# Patient Record
Sex: FEMALE | Race: WHITE | Employment: FULL TIME | URBAN - NONMETROPOLITAN AREA
[De-identification: names, ages, dates, MRNs, and addresses within clinical notes are randomized per-mention and may not be internally consistent; named-entity substitution may affect disease eponyms.]

---

## 2018-06-14 ENCOUNTER — HOSPITAL ENCOUNTER (OUTPATIENT)
Dept: WOMENS IMAGING | Age: 43
Discharge: HOME | End: 2018-06-14
Payer: COMMERCIAL

## 2018-06-14 DIAGNOSIS — Z12.31 VISIT FOR SCREENING MAMMOGRAM: ICD-10-CM

## 2018-06-14 PROCEDURE — 77067 SCR MAMMO BI INCL CAD: CPT

## 2018-06-20 ENCOUNTER — ANESTHESIA (OUTPATIENT)
Dept: ENDOSCOPY | Age: 43
End: 2018-06-20
Payer: COMMERCIAL

## 2018-06-20 ENCOUNTER — HOSPITAL ENCOUNTER (OUTPATIENT)
Age: 43
Setting detail: OUTPATIENT SURGERY
Discharge: HOME | End: 2018-06-20
Attending: INTERNAL MEDICINE | Admitting: INTERNAL MEDICINE
Payer: COMMERCIAL

## 2018-06-20 ENCOUNTER — ANESTHESIA EVENT (OUTPATIENT)
Dept: ENDOSCOPY | Age: 43
End: 2018-06-20
Payer: COMMERCIAL

## 2018-06-20 VITALS
DIASTOLIC BLOOD PRESSURE: 79 MMHG | SYSTOLIC BLOOD PRESSURE: 116 MMHG | OXYGEN SATURATION: 97 % | RESPIRATION RATE: 18 BRPM

## 2018-06-20 VITALS
HEIGHT: 64 IN | TEMPERATURE: 98.2 F | WEIGHT: 233 LBS | HEART RATE: 70 BPM | OXYGEN SATURATION: 99 % | RESPIRATION RATE: 18 BRPM | DIASTOLIC BLOOD PRESSURE: 71 MMHG | BODY MASS INDEX: 39.78 KG/M2 | SYSTOLIC BLOOD PRESSURE: 121 MMHG

## 2018-06-20 PROCEDURE — 3609017100 HC EGD: Performed by: INTERNAL MEDICINE

## 2018-06-20 PROCEDURE — 2500000003 HC RX 250 WO HCPCS: Performed by: NURSE ANESTHETIST, CERTIFIED REGISTERED

## 2018-06-20 PROCEDURE — 6360000002 HC RX W HCPCS: Performed by: NURSE ANESTHETIST, CERTIFIED REGISTERED

## 2018-06-20 PROCEDURE — 7100000001 HC PACU RECOVERY - ADDTL 15 MIN: Performed by: INTERNAL MEDICINE

## 2018-06-20 PROCEDURE — 3609027000 HC COLONOSCOPY: Performed by: INTERNAL MEDICINE

## 2018-06-20 PROCEDURE — 2580000003 HC RX 258: Performed by: INTERNAL MEDICINE

## 2018-06-20 PROCEDURE — 7100000000 HC PACU RECOVERY - FIRST 15 MIN: Performed by: INTERNAL MEDICINE

## 2018-06-20 PROCEDURE — 3700000001 HC ADD 15 MINUTES (ANESTHESIA): Performed by: INTERNAL MEDICINE

## 2018-06-20 PROCEDURE — 3700000000 HC ANESTHESIA ATTENDED CARE: Performed by: INTERNAL MEDICINE

## 2018-06-20 RX ORDER — KETAMINE HYDROCHLORIDE 50 MG/ML
INJECTION, SOLUTION, CONCENTRATE INTRAMUSCULAR; INTRAVENOUS PRN
Status: DISCONTINUED | OUTPATIENT
Start: 2018-06-20 | End: 2018-06-20 | Stop reason: SDUPTHER

## 2018-06-20 RX ORDER — SODIUM CHLORIDE 450 MG/100ML
INJECTION, SOLUTION INTRAVENOUS CONTINUOUS
Status: DISCONTINUED | OUTPATIENT
Start: 2018-06-20 | End: 2018-06-20 | Stop reason: HOSPADM

## 2018-06-20 RX ORDER — GLYCOPYRROLATE 1 MG/5 ML
SYRINGE (ML) INTRAVENOUS PRN
Status: DISCONTINUED | OUTPATIENT
Start: 2018-06-20 | End: 2018-06-20 | Stop reason: SDUPTHER

## 2018-06-20 RX ORDER — LIDOCAINE HYDROCHLORIDE 20 MG/ML
INJECTION, SOLUTION INFILTRATION; PERINEURAL PRN
Status: DISCONTINUED | OUTPATIENT
Start: 2018-06-20 | End: 2018-06-20 | Stop reason: SDUPTHER

## 2018-06-20 RX ORDER — PROPOFOL 10 MG/ML
INJECTION, EMULSION INTRAVENOUS PRN
Status: DISCONTINUED | OUTPATIENT
Start: 2018-06-20 | End: 2018-06-20 | Stop reason: SDUPTHER

## 2018-06-20 RX ADMIN — KETAMINE HYDROCHLORIDE 25 MG: 50 INJECTION, SOLUTION INTRAMUSCULAR; INTRAVENOUS at 11:15

## 2018-06-20 RX ADMIN — PROPOFOL 100 MG: 10 INJECTION, EMULSION INTRAVENOUS at 11:15

## 2018-06-20 RX ADMIN — SODIUM CHLORIDE: 4.5 INJECTION, SOLUTION INTRAVENOUS at 11:09

## 2018-06-20 RX ADMIN — LIDOCAINE HYDROCHLORIDE 100 MG: 20 INJECTION, SOLUTION INFILTRATION; PERINEURAL at 11:15

## 2018-06-20 RX ADMIN — Medication 0.2 MG: at 11:15

## 2018-06-20 RX ADMIN — PROPOFOL 50 MG: 10 INJECTION, EMULSION INTRAVENOUS at 11:21

## 2018-06-20 RX ADMIN — PROPOFOL 50 MG: 10 INJECTION, EMULSION INTRAVENOUS at 11:28

## 2018-06-20 RX ADMIN — PROPOFOL 50 MG: 10 INJECTION, EMULSION INTRAVENOUS at 11:18

## 2018-06-20 ASSESSMENT — PAIN SCALES - GENERAL
PAINLEVEL_OUTOF10: 0
PAINLEVEL_OUTOF10: 0

## 2018-06-20 ASSESSMENT — PAIN - FUNCTIONAL ASSESSMENT: PAIN_FUNCTIONAL_ASSESSMENT: 0-10

## 2018-06-20 ASSESSMENT — COPD QUESTIONNAIRES: CAT_SEVERITY: MODERATE

## 2018-06-20 ASSESSMENT — ENCOUNTER SYMPTOMS: DYSPNEA ACTIVITY LEVEL: NO INTERVAL CHANGE

## 2019-08-28 ENCOUNTER — HOSPITAL ENCOUNTER (OUTPATIENT)
Age: 44
Setting detail: SPECIMEN
Discharge: HOME | End: 2019-08-28
Payer: COMMERCIAL

## 2019-08-28 LAB
ALBUMIN SERPL-MCNC: 4.3 G/DL (ref 3.5–5.2)
ALBUMIN/GLOBULIN RATIO: 1.4 (ref 1–2.5)
ALP BLD-CCNC: 52 U/L (ref 35–104)
ALT SERPL-CCNC: 19 U/L (ref 5–33)
ANION GAP SERPL CALCULATED.3IONS-SCNC: 15 MMOL/L (ref 9–17)
AST SERPL-CCNC: 21 U/L
BILIRUB SERPL-MCNC: 0.18 MG/DL (ref 0.3–1.2)
BILIRUBIN DIRECT: <0.08 MG/DL
BILIRUBIN, INDIRECT: ABNORMAL MG/DL (ref 0–1)
BUN BLDV-MCNC: 14 MG/DL (ref 6–20)
BUN/CREAT BLD: ABNORMAL (ref 9–20)
CALCIUM SERPL-MCNC: 10 MG/DL (ref 8.6–10.4)
CHLORIDE BLD-SCNC: 99 MMOL/L (ref 98–107)
CHOLESTEROL/HDL RATIO: 5
CHOLESTEROL: 212 MG/DL
CO2: 24 MMOL/L (ref 20–31)
CREAT SERPL-MCNC: 0.55 MG/DL (ref 0.5–0.9)
GFR AFRICAN AMERICAN: >60 ML/MIN
GFR NON-AFRICAN AMERICAN: >60 ML/MIN
GFR SERPL CREATININE-BSD FRML MDRD: ABNORMAL ML/MIN/{1.73_M2}
GFR SERPL CREATININE-BSD FRML MDRD: ABNORMAL ML/MIN/{1.73_M2}
GLOBULIN: ABNORMAL G/DL (ref 1.5–3.8)
GLUCOSE BLD-MCNC: 133 MG/DL (ref 70–99)
HDLC SERPL-MCNC: 42 MG/DL
LDL CHOLESTEROL: 118 MG/DL (ref 0–130)
POTASSIUM SERPL-SCNC: 3.7 MMOL/L (ref 3.7–5.3)
SODIUM BLD-SCNC: 138 MMOL/L (ref 135–144)
TOTAL PROTEIN: 7.3 G/DL (ref 6.4–8.3)
TRIGL SERPL-MCNC: 261 MG/DL
VLDLC SERPL CALC-MCNC: ABNORMAL MG/DL (ref 1–30)

## 2019-12-30 ENCOUNTER — HOSPITAL ENCOUNTER (OUTPATIENT)
Age: 44
Setting detail: SPECIMEN
Discharge: HOME | End: 2019-12-30
Payer: COMMERCIAL

## 2019-12-30 LAB
ALBUMIN SERPL-MCNC: 4 G/DL (ref 3.5–5.2)
ALBUMIN/GLOBULIN RATIO: 1.3 (ref 1–2.5)
ALP BLD-CCNC: 46 U/L (ref 35–104)
ALT SERPL-CCNC: 15 U/L (ref 5–33)
ANION GAP SERPL CALCULATED.3IONS-SCNC: 15 MMOL/L (ref 9–17)
AST SERPL-CCNC: 16 U/L
BILIRUB SERPL-MCNC: <0.1 MG/DL (ref 0.3–1.2)
BUN BLDV-MCNC: 14 MG/DL (ref 6–20)
BUN/CREAT BLD: ABNORMAL (ref 9–20)
CALCIUM SERPL-MCNC: 9.1 MG/DL (ref 8.6–10.4)
CHLORIDE BLD-SCNC: 96 MMOL/L (ref 98–107)
CHOLESTEROL/HDL RATIO: 5.3
CHOLESTEROL: 197 MG/DL
CO2: 23 MMOL/L (ref 20–31)
CREAT SERPL-MCNC: 0.67 MG/DL (ref 0.5–0.9)
GFR AFRICAN AMERICAN: >60 ML/MIN
GFR NON-AFRICAN AMERICAN: >60 ML/MIN
GFR SERPL CREATININE-BSD FRML MDRD: ABNORMAL ML/MIN/{1.73_M2}
GFR SERPL CREATININE-BSD FRML MDRD: ABNORMAL ML/MIN/{1.73_M2}
GLUCOSE BLD-MCNC: 126 MG/DL (ref 70–99)
HCT VFR BLD CALC: 45.1 % (ref 36.3–47.1)
HDLC SERPL-MCNC: 37 MG/DL
HEMOGLOBIN: 14 G/DL (ref 11.9–15.1)
LDL CHOLESTEROL DIRECT: 104 MG/DL
LDL CHOLESTEROL: ABNORMAL MG/DL (ref 0–130)
MCH RBC QN AUTO: 28.6 PG (ref 25.2–33.5)
MCHC RBC AUTO-ENTMCNC: 31 G/DL (ref 28.4–34.8)
MCV RBC AUTO: 92 FL (ref 82.6–102.9)
NRBC AUTOMATED: 0 PER 100 WBC
PDW BLD-RTO: 14.2 % (ref 11.8–14.4)
PLATELET # BLD: 283 K/UL (ref 138–453)
PMV BLD AUTO: 11.7 FL (ref 8.1–13.5)
POTASSIUM SERPL-SCNC: 4.2 MMOL/L (ref 3.7–5.3)
RBC # BLD: 4.9 M/UL (ref 3.95–5.11)
SODIUM BLD-SCNC: 134 MMOL/L (ref 135–144)
THYROXINE, FREE: 0.3 NG/DL (ref 0.93–1.7)
TOTAL PROTEIN: 7 G/DL (ref 6.4–8.3)
TRIGL SERPL-MCNC: 404 MG/DL
TSH SERPL DL<=0.05 MIU/L-ACNC: 135.93 MIU/L (ref 0.3–5)
VLDLC SERPL CALC-MCNC: ABNORMAL MG/DL (ref 1–30)
WBC # BLD: 7.3 K/UL (ref 3.5–11.3)

## 2020-03-27 ENCOUNTER — NURSE ONLY (OUTPATIENT)
Dept: LAB | Age: 45
End: 2020-03-27

## 2020-03-27 LAB
ALBUMIN SERPL-MCNC: 3.8 G/DL (ref 3.5–5.1)
ALP BLD-CCNC: 47 U/L (ref 38–126)
ALT SERPL-CCNC: 12 U/L (ref 11–66)
AST SERPL-CCNC: 15 U/L (ref 5–40)
BILIRUB SERPL-MCNC: 0.3 MG/DL (ref 0.3–1.2)
BILIRUBIN DIRECT: < 0.2 MG/DL (ref 0–0.3)
CHOLESTEROL, TOTAL: 159 MG/DL (ref 100–199)
HDLC SERPL-MCNC: 37 MG/DL
LDL CHOLESTEROL CALCULATED: 81 MG/DL
TOTAL PROTEIN: 7.5 G/DL (ref 6.1–8)
TRIGL SERPL-MCNC: 204 MG/DL (ref 0–199)

## 2021-04-08 ENCOUNTER — HOSPITAL ENCOUNTER (OUTPATIENT)
Age: 46
Setting detail: SPECIMEN
Discharge: HOME | End: 2021-04-08
Payer: COMMERCIAL

## 2021-04-08 LAB
CHOLESTEROL/HDL RATIO: 5.1
CHOLESTEROL: 223 MG/DL
HDLC SERPL-MCNC: 44 MG/DL
LDL CHOLESTEROL: 111 MG/DL (ref 0–130)
THYROXINE, FREE: 1.47 NG/DL (ref 0.93–1.7)
TRIGL SERPL-MCNC: 338 MG/DL
TSH SERPL DL<=0.05 MIU/L-ACNC: 13.82 MIU/L (ref 0.3–5)
VLDLC SERPL CALC-MCNC: ABNORMAL MG/DL (ref 1–30)

## 2021-06-16 ENCOUNTER — HOSPITAL ENCOUNTER (OUTPATIENT)
Age: 46
Setting detail: SPECIMEN
Discharge: HOME | End: 2021-06-16
Payer: COMMERCIAL

## 2021-06-16 LAB — TSH SERPL DL<=0.05 MIU/L-ACNC: 1.69 MIU/L (ref 0.3–5)

## 2021-06-30 ENCOUNTER — HOSPITAL ENCOUNTER (OUTPATIENT)
Dept: ULTRASOUND IMAGING | Age: 46
Discharge: HOME | End: 2021-06-30
Payer: COMMERCIAL

## 2021-06-30 DIAGNOSIS — R10.9 ABDOMINAL PAIN, UNSPECIFIED ABDOMINAL LOCATION: ICD-10-CM

## 2021-06-30 PROCEDURE — 76705 ECHO EXAM OF ABDOMEN: CPT

## 2021-07-06 ENCOUNTER — TELEPHONE (OUTPATIENT)
Dept: SURGERY | Age: 46
End: 2021-07-06

## 2021-07-28 ENCOUNTER — OFFICE VISIT (OUTPATIENT)
Dept: SURGERY | Age: 46
End: 2021-07-28
Payer: COMMERCIAL

## 2021-07-28 VITALS
HEART RATE: 71 BPM | DIASTOLIC BLOOD PRESSURE: 62 MMHG | TEMPERATURE: 96.7 F | OXYGEN SATURATION: 98 % | WEIGHT: 225 LBS | RESPIRATION RATE: 18 BRPM | SYSTOLIC BLOOD PRESSURE: 104 MMHG | HEIGHT: 64 IN | BODY MASS INDEX: 38.41 KG/M2

## 2021-07-28 DIAGNOSIS — E11.9 TYPE 2 DIABETES MELLITUS WITHOUT COMPLICATION, UNSPECIFIED WHETHER LONG TERM INSULIN USE (HCC): ICD-10-CM

## 2021-07-28 DIAGNOSIS — Z01.818 PRE-OP TESTING: ICD-10-CM

## 2021-07-28 DIAGNOSIS — E66.9 OBESITY (BMI 30-39.9): ICD-10-CM

## 2021-07-28 DIAGNOSIS — K42.9 PERIUMBILICAL HERNIA: Primary | ICD-10-CM

## 2021-07-28 DIAGNOSIS — Z72.0 TOBACCO ABUSE: ICD-10-CM

## 2021-07-28 PROCEDURE — 99203 OFFICE O/P NEW LOW 30 MIN: CPT | Performed by: SURGERY

## 2021-07-28 RX ORDER — LATANOPROST 50 UG/ML
1 SOLUTION/ DROPS OPHTHALMIC NIGHTLY
COMMUNITY
Start: 2021-07-14

## 2021-07-28 RX ORDER — METFORMIN HYDROCHLORIDE 500 MG/1
1 TABLET, EXTENDED RELEASE ORAL 2 TIMES DAILY
COMMUNITY
Start: 2021-07-14

## 2021-07-28 RX ORDER — DOCUSATE SODIUM 100 MG/1
100 CAPSULE, LIQUID FILLED ORAL DAILY PRN
COMMUNITY

## 2021-07-28 RX ORDER — FERROUS SULFATE 325(65) MG
325 TABLET ORAL EVERY OTHER DAY
Status: ON HOLD | COMMUNITY
End: 2022-10-05 | Stop reason: HOSPADM

## 2021-07-28 RX ORDER — CLOPIDOGREL BISULFATE 75 MG/1
1 TABLET ORAL DAILY
Status: ON HOLD | COMMUNITY
Start: 2021-07-14 | End: 2021-08-25 | Stop reason: HOSPADM

## 2021-08-01 ASSESSMENT — ENCOUNTER SYMPTOMS
RECTAL PAIN: 0
ANAL BLEEDING: 0
WHEEZING: 0
VOICE CHANGE: 0
RHINORRHEA: 0
APNEA: 0
PHOTOPHOBIA: 0
FACIAL SWELLING: 0
COLOR CHANGE: 0
SINUS PRESSURE: 0
VOMITING: 0
NAUSEA: 0
EYE PAIN: 0
COUGH: 0
STRIDOR: 0
SHORTNESS OF BREATH: 0
SORE THROAT: 0
DIARRHEA: 0
ABDOMINAL DISTENTION: 0
EYE ITCHING: 0
BLOOD IN STOOL: 0
EYE DISCHARGE: 0
CHOKING: 0
ALLERGIC/IMMUNOLOGIC NEGATIVE: 1
ABDOMINAL PAIN: 1
TROUBLE SWALLOWING: 0
CONSTIPATION: 0
EYE REDNESS: 0
BACK PAIN: 0
CHEST TIGHTNESS: 0

## 2021-08-01 NOTE — PROGRESS NOTES
Ermias Muñiz (:  1975)     ASSESSMENT:  1.  Umbilical hernia  2. Obesity (BMI 38)   3. Tobacco abuse  4. Diabetes mellitus    PLAN:  1. Schedule Mackenzie for robotic possible open umbilical hernia repair with mesh. 2. She will undergo pre-operative clearance per anesthesia guidelines with risk factors listed under the past medical history diagnosis & problem list.  3. The risks, benefits and alternatives were discussed with Mackenzie including non-operative management. The pros and cons of robotic, laparoscopic and open techniques were discussed. The pros and cons of mesh insertion were discussed. All questions answered. She understands and wishes to proceed with surgical intervention. 4. Restrictions discussed with [address] and she expresses understanding. 5. She is advised to call back directly if there are further questions/concerns, or if her symptoms worsen prior to surgery. 6.  Encouraged tobacco cessation. 7.  Nutritional education occurred. Encourage weight loss. SUBJECTIVE/OBJECTIVE:    Chief Complaint   Patient presents with    Surgical Consult     New patient-referred by [facility]-Periumbilical hernia     HPI  [address] is a 65-year-old female presents for evaluation of a periumbilical hernia. She has had this now for more than a month. Gradually increasing in size. Difficult to reduce. Mild discomfort. Worse with increased activity, lifting and bending over. Worsens throughout the day when she is active. Improved with rest and lying down. Denies any generalized abdominal pain. No nausea or vomiting. Still tolerating a diet. No hematochezia or melena. Normal bowel function. No chest pain or shortness of breath. Tobacco abuse. BMI 38. Current weight 225 pounds. Diabetes mellitus. Recent ultrasound demonstrated 4.7 cm umbilical hernia with intra-abdominal adipose tissue as well as bowel within the hernia sac. Denies significant previous abdominal surgery.     Review of Systems   Constitutional: Negative for activity change, appetite change, chills, diaphoresis, fatigue, fever and unexpected weight change. HENT: Negative for congestion, dental problem, drooling, ear discharge, ear pain, facial swelling, hearing loss, mouth sores, nosebleeds, postnasal drip, rhinorrhea, sinus pressure, sneezing, sore throat, tinnitus, trouble swallowing and voice change. Eyes: Negative for photophobia, pain, discharge, redness, itching and visual disturbance. Respiratory: Negative for apnea, cough, choking, chest tightness, shortness of breath, wheezing and stridor. Cardiovascular: Negative for chest pain, palpitations and leg swelling. Gastrointestinal: Positive for abdominal pain. Negative for abdominal distention, anal bleeding, blood in stool, constipation, diarrhea, nausea, rectal pain and vomiting. Endocrine: Negative. Genitourinary: Negative for decreased urine volume, difficulty urinating, dyspareunia, dysuria, enuresis, flank pain, frequency, genital sores, hematuria, menstrual problem, pelvic pain, urgency, vaginal bleeding, vaginal discharge and vaginal pain. Musculoskeletal: Negative for arthralgias, back pain, gait problem, joint swelling, myalgias, neck pain and neck stiffness. Skin: Negative for color change, pallor, rash and wound. Allergic/Immunologic: Negative. Neurological: Negative for dizziness, tremors, seizures, syncope, facial asymmetry, speech difficulty, weakness, light-headedness, numbness and headaches. Hematological: Negative for adenopathy. Does not bruise/bleed easily. Psychiatric/Behavioral: Negative for agitation, behavioral problems, confusion, decreased concentration, dysphoric mood, hallucinations, self-injury, sleep disturbance and suicidal ideas. The patient is not nervous/anxious and is not hyperactive.         Past Medical History:   Diagnosis Date    Backache     COPD (chronic obstructive pulmonary disease) ([facility] [address]     Coronary artery disease     Dr. Napoleon Valdivia Environmental allergies     Hyperlipidemia     Hypertension     Hypothyroidism     Type 2 diabetes mellitus ([address]        Past Surgical History:   Procedure Laterality Date    COLONOSCOPY      [facility]  [facility]    MOUTH SURGERY  2014    teeth removed    [place] COLONOSCOPY FLX DX W/COLLJ SPEC WHEN PFRMD N/A 06/20/2018    COLONOSCOPY performed [facility] Melissa Tsang [facility] at [facility]    [place] EGD TRANSORAL BIOPSY SINGLE/MULTIPLE N/A 06/20/2018    [facility]    UPPER GASTROINTESTINAL ENDOSCOPY         Current Outpatient Medications   Medication Sig Dispense Refill    clopidogrel (PLAVIX) 75 MG tablet Take 1 tablet by mouth daily      metFORMIN (GLUCOPHAGE-XR) 500 MG extended release tablet Take 1 tablet by mouth 2 times daily      latanoprost (XALATAN) 0.005 % ophthalmic solution Apply 1 drop to eye nightly      docusate sodium (COLACE) 100 MG capsule Take 100 mg by mouth daily as needed for Constipation      ferrous sulfate (IRON 325) 325 (65 Fe) MG tablet Take 325 mg by mouth daily (with breakfast)      aspirin 81 MG chewable tablet Take 1 tablet by mouth daily 30 tablet 3    losartan (COZAAR) 25 MG tablet Take 1 tablet by mouth daily 30 tablet 3    metoprolol (LOPRESSOR) 25 MG tablet Take 1 tablet by mouth 2 times daily 60 tablet 3    levothyroxine (SYNTHROID) 200 MCG tablet Take 0.5 tablets by mouth daily (Patient taking differently: Take 175 mcg by mouth daily ) 30 tablet 3    hydrochlorothiazide (HYDRODIURIL) 25 MG tablet Take 25 mg by mouth daily      acetaminophen (TYLENOL) 325 MG tablet Take 650 mg by mouth every 6 hours as needed.  pravastatin (PRAVACHOL) 40 MG tablet Take 80 mg by mouth daily       therapeutic multivitamin-minerals (THERAGRAN-M) tablet Take 1 tablet by mouth daily.         albuterol sulfate  (90 BASE) MCG/ACT inhaler Inhale 2 puffs into the lungs every 6 hours as needed for Wheezing (Patient not taking: Reported on 7/28/2021)      nitroGLYCERIN (NITROSTAT) 0.4 MG SL tablet Place 1 tablet under the tongue every 5 minutes as needed for Chest pain (Patient not taking: Reported on 7/28/2021) 25 tablet 3     No current facility-administered medications for this visit. Allergies   Allergen Reactions    Vicodin [Hydrocodone-Acetaminophen] Anxiety       Family History   Problem Relation Age of Onset    Thyroid Disease Mother     Heart Disease Mother     Cancer Father     Diabetes Father     High Blood Pressure Father     Asthma Brother        Social History     Socioeconomic History    Marital status: Single     Spouse name: Not on file    Number of children: Not on file    Years of education: Not on file    Highest education level: Not on file   Occupational History    Not on file   Tobacco Use    Smoking status: Current Every Day Smoker     Packs/day: 0.50     Types: Cigarettes    Smokeless tobacco: Never Used   Substance and Sexual Activity    Alcohol use: No     Comment: occasionally    Drug use: No    Sexual activity: Yes   Other Topics Concern    Not on file   Social History Narrative    Not on file     Social Determinants of Health     Financial Resource Strain:     Difficulty of Paying Living Expenses:    Food Insecurity:     Worried About Running Out of Food in the Last Year:     Ran Out of Food in the Last Year:    Transportation Needs:     Lack of Transportation (Medical):      Lack of Transportation (Non-Medical):    Physical Activity:     Days of Exercise per Week:     Minutes of Exercise per Session:    Stress:     Feeling of Stress :    Social Connections:     Frequency of Communication with Friends and Family:     Frequency of Social Gatherings with Friends and Family:     Attends Orthodox Services:     Active Member of Clubs or Organizations:     Attends Club or Organization Meetings:     Marital Status:    Intimate Partner Violence:     Fear of Current or Ex-Partner:     Emotionally Abused:     Physically Abused:     Sexually Abused:      Vitals:    07/28/21 1118   BP: 104/62   Site: Right Upper Arm   Position: Sitting   Cuff Size: Medium Adult   Pulse: 71   Resp: 18   Temp: 96.7 °F (35.9 °C)   TempSrc: Infrared   SpO2: 98%   Weight: 225 lb (102.1 kg)   Height: 5' 4\" (1.626 m)     Body mass index is 38.62 kg/m². Wt Readings from Last 3 Encounters:   07/28/21 225 lb (102.1 kg)   06/20/18 233 lb (105.7 kg)   04/14/16 220 lb (99.8 kg)     Physical Exam  Vitals reviewed. Constitutional:       General: She is not in acute distress. Appearance: She is well-developed. She is not diaphoretic. HENT:      Head: Normocephalic and atraumatic. Right Ear: External ear normal.      Left Ear: External ear normal.      Nose: Nose normal.   Eyes:      General: No scleral icterus. Right eye: No discharge. Left eye: No discharge. Conjunctiva/sclera: Conjunctivae normal.   Cardiovascular:      Rate and Rhythm: Normal rate and regular rhythm. Heart sounds: Normal heart sounds. Pulmonary:      Effort: Pulmonary effort is normal. No respiratory distress. Breath sounds: Normal breath sounds. No wheezing or rales. Chest:      Chest wall: No tenderness. Abdominal:      General: Bowel sounds are normal. There is no distension. Palpations: Abdomen is soft. There is no mass. Tenderness: There is abdominal tenderness in the periumbilical area. There is no guarding or rebound. Hernia: A hernia is present. Hernia is present in the umbilical area. Musculoskeletal:         General: No tenderness. Normal range of motion. Cervical back: Normal range of motion and neck supple. Skin:     General: Skin is warm and dry. Coloration: Skin is not pale. Findings: No erythema or rash. Neurological:      Mental Status: She is alert and oriented to person, place, and time. Cranial Nerves: No cranial nerve deficit. Psychiatric:         Behavior: Behavior normal.         Thought Content: Thought content normal.         Judgment: Judgment normal.       Lab Results   Component Value Date    WBC 7.3 12/30/2019    HGB 14.0 12/30/2019    HCT 45.1 12/30/2019    MCV 92.0 12/30/2019     12/30/2019     Lab Results   Component Value Date     (L) 12/30/2019    K 4.2 12/30/2019    CL 96 (L) 12/30/2019    CO2 23 12/30/2019     Lab Results   Component Value Date    CREATININE 0.67 12/30/2019     Lab Results   Component Value Date    ALT 12 03/27/2020    AST 15 03/27/2020    ALKPHOS 47 03/27/2020    BILITOT 0.3 03/27/2020     No results found for: LIPASE    Patient Active Problem List   Diagnosis    Hypothyroidism    Cellulitis of leg, right    Hyperlipidemia    Chest pain at rest    H/O class III angina pectoris    Abnormal nuclear stress test     Narrative   EXAMINATION: US ABDOMEN LIMITED.       HISTORY: 44-year-old patient with periumbilical abdominal pain.       TECHNIQUE: Ultrasound scan was carried out through the periumbilical region.       COMPARISON: None available.       FINDINGS: There appears be a periumbilical hernia containing fat and bowel loops measuring 4.7 cm in length.           Impression   1. Periumbilical hernia containing fat and bowel loops measuring 4.7 cm in length.       Final report electronically signed by DR Antonette Bess on 6/30/2021 10:25 AM       An electronic signature was used to authenticate this note.     --Chris Moya MD

## 2021-08-27 ENCOUNTER — TELEPHONE (OUTPATIENT)
Dept: SURGERY | Age: 46
End: 2021-08-27

## 2021-08-27 NOTE — TELEPHONE ENCOUNTER
Spoke w/ pt after receiving clearance from Dr. Charito Jones to proceed with surgery on 9/16, pt moderate to high risk. Due to Dr. Aguilar Baker changing pt  From Plavix to Eliquis called pt and went over her clearance being moderate to high risk for surgery, also informed pt she she would need to hold Eliquis 2 days prior to surgery instead of 5 for Plavix. Pt unsure at this point if she wants to proceed with surgery since she is at higher risk. She says she would like to think it over and call me back with a decision next week.

## 2021-09-07 NOTE — TELEPHONE ENCOUNTER
Reached out to patient to see if she would like to proceed with surgery next Thursday knowing she is at high risk, pt wishes to cancel surgery at this time.    Will continue to follow up with Dr. Jim Hernandez

## 2022-02-21 ENCOUNTER — HOSPITAL ENCOUNTER (OUTPATIENT)
Age: 47
Setting detail: SPECIMEN
Discharge: HOME | End: 2022-02-21

## 2022-02-21 LAB
ALBUMIN SERPL-MCNC: 4.3 G/DL (ref 3.5–5.2)
ALBUMIN/GLOBULIN RATIO: 1.5 (ref 1–2.5)
ALP BLD-CCNC: 65 U/L (ref 35–104)
ALT SERPL-CCNC: 14 U/L (ref 5–33)
ANION GAP SERPL CALCULATED.3IONS-SCNC: 13 MMOL/L (ref 9–17)
AST SERPL-CCNC: 15 U/L
BILIRUB SERPL-MCNC: 0.26 MG/DL (ref 0.3–1.2)
BILIRUBIN DIRECT: 0.08 MG/DL
BILIRUBIN, INDIRECT: 0.18 MG/DL (ref 0–1)
BUN BLDV-MCNC: 13 MG/DL (ref 6–20)
CALCIUM SERPL-MCNC: 9.7 MG/DL (ref 8.6–10.4)
CHLORIDE BLD-SCNC: 98 MMOL/L (ref 98–107)
CHOLESTEROL/HDL RATIO: 5.7
CHOLESTEROL: 233 MG/DL
CO2: 25 MMOL/L (ref 20–31)
CREAT SERPL-MCNC: 0.58 MG/DL (ref 0.5–0.9)
GFR AFRICAN AMERICAN: >60 ML/MIN
GFR NON-AFRICAN AMERICAN: >60 ML/MIN
GFR SERPL CREATININE-BSD FRML MDRD: ABNORMAL ML/MIN/{1.73_M2}
GLUCOSE BLD-MCNC: 114 MG/DL (ref 70–99)
HCT VFR BLD CALC: 45.2 % (ref 36.3–47.1)
HDLC SERPL-MCNC: 41 MG/DL
HEMOGLOBIN: 14.3 G/DL (ref 11.9–15.1)
LDL CHOLESTEROL: 133 MG/DL (ref 0–130)
MCH RBC QN AUTO: 27.9 PG (ref 25.2–33.5)
MCHC RBC AUTO-ENTMCNC: 31.6 G/DL (ref 28.4–34.8)
MCV RBC AUTO: 88.3 FL (ref 82.6–102.9)
NRBC AUTOMATED: 0 PER 100 WBC
PDW BLD-RTO: 15.2 % (ref 11.8–14.4)
PLATELET # BLD: 261 K/UL (ref 138–453)
PMV BLD AUTO: 12.2 FL (ref 8.1–13.5)
POTASSIUM SERPL-SCNC: 4.4 MMOL/L (ref 3.7–5.3)
RBC # BLD: 5.12 M/UL (ref 3.95–5.11)
SODIUM BLD-SCNC: 136 MMOL/L (ref 135–144)
TOTAL PROTEIN: 7.2 G/DL (ref 6.4–8.3)
TRIGL SERPL-MCNC: 295 MG/DL
WBC # BLD: 8.8 K/UL (ref 3.5–11.3)

## 2022-03-30 ENCOUNTER — OFFICE VISIT (OUTPATIENT)
Dept: SURGERY | Age: 47
End: 2022-03-30
Payer: COMMERCIAL

## 2022-03-30 VITALS
TEMPERATURE: 97.2 F | WEIGHT: 222 LBS | BODY MASS INDEX: 36.99 KG/M2 | HEIGHT: 65 IN | OXYGEN SATURATION: 98 % | RESPIRATION RATE: 14 BRPM | SYSTOLIC BLOOD PRESSURE: 122 MMHG | HEART RATE: 67 BPM | DIASTOLIC BLOOD PRESSURE: 78 MMHG

## 2022-03-30 DIAGNOSIS — K42.9 PERIUMBILICAL HERNIA: Primary | ICD-10-CM

## 2022-03-30 PROCEDURE — 99213 OFFICE O/P EST LOW 20 MIN: CPT | Performed by: SURGERY

## 2022-03-30 RX ORDER — EZETIMIBE 10 MG/1
10 TABLET ORAL DAILY
COMMUNITY

## 2022-04-01 ENCOUNTER — TELEPHONE (OUTPATIENT)
Dept: SURGERY | Age: 47
End: 2022-04-01

## 2022-04-03 ASSESSMENT — ENCOUNTER SYMPTOMS
ABDOMINAL DISTENTION: 0
ABDOMINAL PAIN: 1
BACK PAIN: 0
COLOR CHANGE: 0
NAUSEA: 0
COUGH: 0
FACIAL SWELLING: 0
ANAL BLEEDING: 0
VOMITING: 0
EYE PAIN: 0
CHEST TIGHTNESS: 0
TROUBLE SWALLOWING: 0
SINUS PRESSURE: 0
EYE REDNESS: 0
PHOTOPHOBIA: 0
RECTAL PAIN: 0
CONSTIPATION: 0
SORE THROAT: 0
STRIDOR: 0
ALLERGIC/IMMUNOLOGIC NEGATIVE: 1
APNEA: 0
CHOKING: 0
VOICE CHANGE: 0
BLOOD IN STOOL: 0
RHINORRHEA: 0
WHEEZING: 0
EYE ITCHING: 0
EYE DISCHARGE: 0
DIARRHEA: 0
SHORTNESS OF BREATH: 0

## 2022-04-03 NOTE — PROGRESS NOTES
Gunnar Toney (:  1975)     ASSESSMENT:  1.  Umbilical hernia  2. Obesity (BMI 36)  3. Tobacco abuse  4. Diabetes mellitus    PLAN:  1. Schedule Mackenzie for robotic umbilical hernia repair with mesh. 2. She will undergo pre-operative clearance per anesthesia guidelines with risk factors listed under the past medical history diagnosis & problem list.  3. The risks, benefits and alternatives were discussed with Mackenzie including non-operative management. The pros and cons of robotic, laparoscopic and open techniques were discussed. The pros and cons of mesh insertion were discussed. All questions answered. She understands and wishes to proceed with surgical intervention. 4. Restrictions discussed with Rodrigo Ying and she expresses understanding. 5. She is advised to call back directly if there are further questions/concerns, or if her symptoms worsen prior to surgery. SUBJECTIVE/OBJECTIVE:    Chief Complaint   Patient presents with    Surgical Consult     Est patient-last seen in the office 2021-Periumbilical hernia-ready to schedule     HPI  Oragata Ayad is a 48-year-old female who was originally seen back in 7106 due to umbilical hernia. She was found to have umbilical hernia. She states this has slightly worsened. She is now cleared from a cardiac standpoint for surgery. Difficult to reduce hernia. Mild to moderate discomfort. Worse with increased activity, lifting and bending over. Worsens throughout the day when she is active. Improves with rest.  No skin changes. No generalized abdominal pain. Still tolerating a diet. No significant nausea or vomiting. Normal bowel function. No hematochezia or melena. No chest pain or shortness of breath. No new urinary complaints. She is wishing to proceed with surgery at this time. Review of Systems   Constitutional: Negative for activity change, appetite change, chills, diaphoresis, fatigue, fever and unexpected weight change.    HENT: Negative for congestion, dental problem, drooling, ear discharge, ear pain, facial swelling, hearing loss, mouth sores, nosebleeds, postnasal drip, rhinorrhea, sinus pressure, sneezing, sore throat, tinnitus, trouble swallowing and voice change. Eyes: Negative for photophobia, pain, discharge, redness, itching and visual disturbance. Respiratory: Negative for apnea, cough, choking, chest tightness, shortness of breath, wheezing and stridor. Cardiovascular: Negative for chest pain, palpitations and leg swelling. Gastrointestinal: Positive for abdominal pain. Negative for abdominal distention, anal bleeding, blood in stool, constipation, diarrhea, nausea, rectal pain and vomiting. Endocrine: Negative. Genitourinary: Negative for decreased urine volume, difficulty urinating, dyspareunia, dysuria, enuresis, flank pain, frequency, genital sores, hematuria, menstrual problem, pelvic pain, urgency, vaginal bleeding, vaginal discharge and vaginal pain. Musculoskeletal: Negative for arthralgias, back pain, gait problem, joint swelling, myalgias, neck pain and neck stiffness. Skin: Negative for color change, pallor, rash and wound. Allergic/Immunologic: Negative. Neurological: Negative for dizziness, tremors, seizures, syncope, facial asymmetry, speech difficulty, weakness, light-headedness, numbness and headaches. Hematological: Negative for adenopathy. Does not bruise/bleed easily. Psychiatric/Behavioral: Negative for agitation, behavioral problems, confusion, decreased concentration, dysphoric mood, hallucinations, self-injury, sleep disturbance and suicidal ideas. The patient is not nervous/anxious and is not hyperactive.         Past Medical History:   Diagnosis Date    Asthma     Backache     [facility] ([facility]     Patrick Ceron    Coronary artery disease     Dr. Eneida Hernandez Disease of blood and blood forming organ     anemia    Environmental allergies     Hyperlipidemia     Hypertension     Hypothyroidism     Type 2 diabetes mellitus ([address]        Past Surgical History:   Procedure Laterality Date    COLONOSCOPY      [facility]    MOUTH SURGERY  2014    teeth removed    [place] COLONOSCOPY FLX DX W/COLLJ SPEC WHEN PFRMD N/A 06/20/2018    COLONOSCOPY performed by Michelle Mayo MD at [facility]    [place] EGD TRANSORAL BIOPSY SINGLE/MULTIPLE N/A 06/20/2018    [facility]    UPPER GASTROINTESTINAL ENDOSCOPY         Current Outpatient Medications   Medication Sig Dispense Refill    ezetimibe (ZETIA) 10 MG tablet Take 10 mg by mouth daily      levothyroxine (SYNTHROID) 175 MCG tablet Take 175 mcg by mouth Daily      apixaban (ELIQUIS) 5 MG TABS tablet Take 1 tablet by mouth 2 times daily 180 tablet 1    metFORMIN (GLUCOPHAGE-XR) 500 MG extended release tablet Take 1 tablet by mouth 2 times daily      latanoprost (XALATAN) 0.005 % ophthalmic solution Apply 1 drop to eye nightly      docusate sodium (COLACE) 100 MG capsule Take 100 mg by mouth daily as needed for Constipation      ferrous sulfate (IRON 325) 325 (65 Fe) MG tablet Take 325 mg by mouth every other day       albuterol sulfate  (90 BASE) MCG/ACT inhaler Inhale 2 puffs into the lungs every 6 hours as needed for Wheezing       aspirin 81 MG chewable tablet Take 1 tablet by mouth daily 30 tablet 3    nitroGLYCERIN (NITROSTAT) 0.4 MG SL tablet Place 1 tablet under the tongue every 5 minutes as needed for Chest pain 25 tablet 3    losartan (COZAAR) 25 MG tablet Take 1 tablet by mouth daily 30 tablet 3    metoprolol (LOPRESSOR) 25 MG tablet Take 1 tablet by mouth 2 times daily 60 tablet 3    hydrochlorothiazide (HYDRODIURIL) 25 MG tablet Take 25 mg by mouth daily      acetaminophen (TYLENOL) 325 MG tablet Take 650 mg by mouth every 6 hours as needed.         pravastatin (PRAVACHOL) 40 MG tablet Take 80 mg by mouth daily       therapeutic multivitamin-minerals (THERAGRAN-M) tablet Take 1 tablet by mouth daily. No current facility-administered medications for this visit. Allergies   Allergen Reactions    Vicodin [Hydrocodone-Acetaminophen] Anxiety       Family History   Problem Relation Age of Onset    Thyroid Disease Mother     Heart Disease Mother     Cancer Father     Diabetes Father     High Blood Pressure Father     Asthma Brother        Social History     Socioeconomic History    Marital status: Single     Spouse name: Not on file    Number of children: Not on file    Years of education: Not on file    Highest education level: Not on file   Occupational History    Not on file   Tobacco Use    Smoking status: Current Every Day Smoker     Packs/day: 0.50     Types: Cigarettes    Smokeless tobacco: Never Used   Substance and Sexual Activity    Alcohol use: Yes     Comment: occasionally    Drug use: No    Sexual activity: Yes     Partners: Male   Other Topics Concern    Not on file   Social History Narrative    Not on file     Social Determinants of Health     Financial Resource Strain:     Difficulty of Paying Living Expenses: Not on file   Food Insecurity:     Worried About Running Out of Food in the Last Year: Not on file    [place]ie of Food in the Last Year: Not on file   Transportation Needs:     Lack of Transportation (Medical): Not on file    Lack of Transportation (Non-Medical):  Not on file   Physical Activity:     Days of Exercise per Week: Not on file    Minutes of Exercise per Session: Not on file   Stress:     Feeling of Stress : Not on file   Social Connections:     Frequency of Communication with Friends and Family: Not on file    Frequency of Social Gatherings with Friends and Family: Not on file    Attends Evangelical Services: Not on file    Active Member of Clubs or Organizations: Not on file    Attends Club or Organization Meetings: Not on file    Marital Status: Not on file   Intimate Partner Violence:     Fear of Current or Ex-Partner: Not on file    Emotionally Abused: Not on file    Physically Abused: Not on file    Sexually Abused: Not on file   Housing Stability:     Unable to Pay for Housing in the Last Year: Not on file    Number of [place] in the Last Year: Not on file    Unstable Housing in the Last Year: Not on file     Vitals:    03/30/22 1348   BP: 122/78   Site: Left Upper Arm   Position: Sitting   Cuff Size: Medium Adult   Pulse: 67   Resp: 14   Temp: 97.2 °F (36.2 °C)   TempSrc: Tympanic   SpO2: 98%   Weight: 222 lb (100.7 kg)   Height: 5' 5\" (1.651 m)     Body mass index is 36.94 kg/m². Wt Readings from Last 3 Encounters:   03/30/22 222 lb (100.7 kg)   08/25/21 225 lb (102.1 kg)   07/28/21 225 lb (102.1 kg)     Physical Exam  Vitals reviewed. Constitutional:       General: She is not in acute distress. Appearance: She is well-developed. She is not diaphoretic. HENT:      Head: Normocephalic and atraumatic. Right Ear: External ear normal.      Left Ear: External ear normal.      Nose: Nose normal.   Eyes:      General: No scleral icterus. Right eye: No discharge. Left eye: No discharge. Conjunctiva/sclera: Conjunctivae normal.   Cardiovascular:      Rate and Rhythm: Normal rate and regular rhythm. Heart sounds: Normal heart sounds. Pulmonary:      Effort: Pulmonary effort is normal. No respiratory distress. Breath sounds: Normal breath sounds. No wheezing or rales. Chest:      Chest wall: No tenderness. Abdominal:      General: Bowel sounds are normal. There is no distension. Palpations: Abdomen is soft. There is no mass. Tenderness: There is abdominal tenderness in the periumbilical area. There is no guarding or rebound. Hernia: A hernia is present. Hernia is present in the umbilical area. Musculoskeletal:         General: No tenderness. Normal range of motion. Cervical back: Normal range of motion and neck supple. Skin:     General: Skin is warm and dry. Coloration: Skin is not pale. Findings: No erythema or rash. Neurological:      Mental Status: She is alert and oriented to person, place, and time. Cranial Nerves: No cranial nerve deficit. Psychiatric:         Behavior: Behavior normal.         Thought Content: Thought content normal.         Judgment: Judgment normal.       Lab Results   Component Value Date    WBC 8.8 02/21/2022    HGB 14.3 02/21/2022    HCT 45.2 02/21/2022    MCV 88.3 02/21/2022     02/21/2022     Lab Results   Component Value Date     02/21/2022    K 4.4 02/21/2022    CL 98 02/21/2022    CO2 25 02/21/2022     Lab Results   Component Value Date    CREATININE 0.58 02/21/2022     Lab Results   Component Value Date    ALT 14 02/21/2022    AST 15 02/21/2022    ALKPHOS 65 02/21/2022    BILITOT 0.26 (L) 02/21/2022     No results found for: LIPASE    Patient Active Problem List   Diagnosis    Hypothyroidism    Cellulitis of leg, right    Hyperlipidemia    Chest pain at rest    H/O class III angina pectoris    Abnormal nuclear stress test       An electronic signature was used to authenticate this note.     --Katlyn Pozo MD

## 2022-04-13 ENCOUNTER — PREP FOR PROCEDURE (OUTPATIENT)
Dept: SURGERY | Age: 47
End: 2022-04-13

## 2022-04-13 RX ORDER — SODIUM CHLORIDE 9 MG/ML
INJECTION, SOLUTION INTRAVENOUS CONTINUOUS
Status: CANCELLED | OUTPATIENT
Start: 2022-04-13

## 2022-04-24 NOTE — H&P
Chad Cardenas (:  1975)      ASSESSMENT:  1.  Umbilical hernia  2. Obesity (BMI 36)  3. Tobacco abuse  4. Diabetes mellitus     PLAN:  1. Schedule Mackenzie for robotic umbilical hernia repair with mesh. 2. She will undergo pre-operative clearance per anesthesia guidelines with risk factors listed under the past medical history diagnosis & problem list.  3. The risks, benefits and alternatives were discussed with Mackenzie including non-operative management. The pros and cons of robotic, laparoscopic and open techniques were discussed. The pros and cons of mesh insertion were discussed. All questions answered. She understands and wishes to proceed with surgical intervention. 4. Restrictions discussed with Yesenia Mendez and she expresses understanding. 5. She is advised to call back directly if there are further questions/concerns, or if her symptoms worsen prior to surgery.     SUBJECTIVE/OBJECTIVE:          Chief Complaint   Patient presents with    Surgical Consult       Est patient-last seen in the office 2021-Periumbilical hernia-ready to schedule      HPI  Maxi Perdomo is a 43-year-old female who was originally seen back in 4154 due to umbilical hernia. She was found to have umbilical hernia. She states this has slightly worsened. She is now cleared from a cardiac standpoint for surgery. Difficult to reduce hernia. Mild to moderate discomfort. Worse with increased activity, lifting and bending over. Worsens throughout the day when she is active. Improves with rest.  No skin changes. No generalized abdominal pain. Still tolerating a diet. No significant nausea or vomiting. Normal bowel function. No hematochezia or melena. No chest pain or shortness of breath. No new urinary complaints. She is wishing to proceed with surgery at this time.     Review of Systems   Constitutional: Negative for activity change, appetite change, chills, diaphoresis, fatigue, fever and unexpected weight change. HENT: Negative for congestion, dental problem, drooling, ear discharge, ear pain, facial swelling, hearing loss, mouth sores, nosebleeds, postnasal drip, rhinorrhea, sinus pressure, sneezing, sore throat, tinnitus, trouble swallowing and voice change. Eyes: Negative for photophobia, pain, discharge, redness, itching and visual disturbance. Respiratory: Negative for apnea, cough, choking, chest tightness, shortness of breath, wheezing and stridor. Cardiovascular: Negative for chest pain, palpitations and leg swelling. Gastrointestinal: Positive for abdominal pain. Negative for abdominal distention, anal bleeding, blood in stool, constipation, diarrhea, nausea, rectal pain and vomiting. Endocrine: Negative. Genitourinary: Negative for decreased urine volume, difficulty urinating, dyspareunia, dysuria, enuresis, flank pain, frequency, genital sores, hematuria, menstrual problem, pelvic pain, urgency, vaginal bleeding, vaginal discharge and vaginal pain. Musculoskeletal: Negative for arthralgias, back pain, gait problem, joint swelling, myalgias, neck pain and neck stiffness. Skin: Negative for color change, pallor, rash and wound. Allergic/Immunologic: Negative. Neurological: Negative for dizziness, tremors, seizures, syncope, facial asymmetry, speech difficulty, weakness, light-headedness, numbness and headaches. Hematological: Negative for adenopathy. Does not bruise/bleed easily. Psychiatric/Behavioral: Negative for agitation, behavioral problems, confusion, decreased concentration, dysphoric mood, hallucinations, self-injury, sleep disturbance and suicidal ideas.  The patient is not nervous/anxious and is not hyperactive.          Past Medical History        Past Medical History:   Diagnosis Date    Asthma      Backache      COPD (chronic obstructive pulmonary disease) ([facility] [address]       [facility]    Coronary artery disease       Dr. Napoleon Valdivia Disease of blood and blood forming organ       anemia    Environmental allergies      Hyperlipidemia      Hypertension      Hypothyroidism      Type 2 diabetes mellitus ([facility] [address]              Past Surgical History         Past Surgical History:   Procedure Laterality Date    COLONOSCOPY        CORONARY ANGIOPLASTY WITH STENT PLACEMENT   2016     [facility]    MOUTH SURGERY   2014     teeth removed    SC COLONOSCOPY FLX DX W/COLLJ SPEC WHEN PFRMD N/A 06/20/2018     COLONOSCOPY performed by Shareen Hodgkin, MD at [facility]    SC EGD TRANSORAL BIOPSY SINGLE/MULTIPLE N/A 06/20/2018     EGD performed by Shareen Hodgkin, MD at [facility]    UPPER GASTROINTESTINAL ENDOSCOPY                Current Facility-Administered Medications          Current Outpatient Medications   Medication Sig Dispense Refill    ezetimibe (ZETIA) 10 MG tablet Take 10 mg by mouth daily        levothyroxine (SYNTHROID) 175 MCG tablet Take 175 mcg by mouth Daily        apixaban (ELIQUIS) 5 MG TABS tablet Take 1 tablet by mouth 2 times daily 180 tablet 1    metFORMIN (GLUCOPHAGE-XR) 500 MG extended release tablet Take 1 tablet by mouth 2 times daily        latanoprost (XALATAN) 0.005 % ophthalmic solution Apply 1 drop to eye nightly        docusate sodium (COLACE) 100 MG capsule Take 100 mg by mouth daily as needed for Constipation        ferrous sulfate (IRON 325) 325 (65 Fe) MG tablet Take 325 mg by mouth every other day         albuterol sulfate  (90 BASE) MCG/ACT inhaler Inhale 2 puffs into the lungs every 6 hours as needed for Wheezing         aspirin 81 MG chewable tablet Take 1 tablet by mouth daily 30 tablet 3    nitroGLYCERIN (NITROSTAT) 0.4 MG SL tablet Place 1 tablet under the tongue every 5 minutes as needed for Chest pain 25 tablet 3    losartan (COZAAR) 25 MG tablet Take 1 tablet by mouth daily 30 tablet 3    metoprolol (LOPRESSOR) 25 MG tablet Take 1 tablet by mouth 2 times daily 60 tablet 3    hydrochlorothiazide (HYDRODIURIL) 25 MG tablet Take 25 mg by mouth daily        acetaminophen (TYLENOL) 325 MG tablet Take 650 mg by mouth every 6 hours as needed.          pravastatin (PRAVACHOL) 40 MG tablet Take 80 mg by mouth daily         therapeutic multivitamin-minerals (THERAGRAN-M) tablet Take 1 tablet by mouth daily.            No current facility-administered medications for this visit.                 Allergies   Allergen Reactions    Vicodin [Hydrocodone-Acetaminophen] Anxiety         Family History         Family History   Problem Relation Age of Onset    Thyroid Disease Mother      Heart Disease Mother      Cancer Father      Diabetes Father      High Blood Pressure Father      Asthma Brother              Social History               Socioeconomic History    Marital status: Single       Spouse name: Not on file    Number of children: Not on file    Years of education: Not on file    Highest education level: Not on file   Occupational History    Not on file   Tobacco Use    Smoking status: Current Every Day Smoker       Packs/day: 0.50       Types: Cigarettes    Smokeless tobacco: Never Used   Substance and Sexual Activity    Alcohol use: Yes       Comment: occasionally    Drug use: No    Sexual activity: Yes       Partners: Male   Other Topics Concern    Not on file   Social History Narrative    Not on file      Social Determinants of Health          Financial Resource Strain:     Difficulty of Paying Living Expenses: Not on file   Food Insecurity:     Worried About Running Out of Food in the Last Year: Not on file    Fernie of Food in the Last Year: Not on file   Transportation Needs:     Lack of Transportation (Medical): Not on file    Lack of Transportation (Non-Medical):  Not on file   Physical Activity:     Days of Exercise per Week: Not on file    Minutes of Exercise per Session: Not on file   Stress:     Feeling of Stress : Not on file   Social Connections:     Frequency of Communication with Friends and Family: Not on file    Frequency of Social Gatherings with Friends and Family: Not on file    Attends Jain Services: Not on file    Active Member of Clubs or Organizations: Not on file    Attends Club or Organization Meetings: Not on file    Marital Status: Not on file   Intimate Partner Violence:     Fear of Current or Ex-Partner: Not on file    Emotionally Abused: Not on file    Physically Abused: Not on file    Sexually Abused: Not on file   Housing Stability:     Unable to Pay for Housing in the Last Year: Not on file    Number of [place] in the Last Year: Not on file    Unstable Housing in the Last Year: Not on file         Vitals       Vitals:     03/30/22 1348   BP: 122/78   Site: Left Upper Arm   Position: Sitting   Cuff Size: Medium Adult   Pulse: 67   Resp: 14   Temp: 97.2 °F (36.2 °C)   TempSrc: Tympanic   SpO2: 98%   Weight: 222 lb (100.7 kg)   Height: 5' 5\" (1.651 m)         Body mass index is 36.94 kg/m².         Wt Readings from Last 3 Encounters:   03/30/22 222 lb (100.7 kg)   08/25/21 225 lb (102.1 kg)   07/28/21 225 lb (102.1 kg)      Physical Exam  Vitals reviewed. Constitutional:       General: She is not in acute distress. Appearance: She is well-developed. She is not diaphoretic. HENT:      Head: Normocephalic and atraumatic. Right Ear: External ear normal.      Left Ear: External ear normal.      Nose: Nose normal.   Eyes:      General: No scleral icterus. Right eye: No discharge. Left eye: No discharge. Conjunctiva/sclera: Conjunctivae normal.   Cardiovascular:      Rate and Rhythm: Normal rate and regular rhythm. Heart sounds: Normal heart sounds. Pulmonary:      Effort: Pulmonary effort is normal. No respiratory distress. Breath sounds: Normal breath sounds. No wheezing or rales. Chest:      Chest wall: No tenderness. Abdominal:      General: Bowel sounds are normal. There is no distension. Palpations: Abdomen is soft.  There is no mass.      Tenderness: There is abdominal tenderness in the periumbilical area. There is no guarding or rebound. Hernia: A hernia is present. Hernia is present in the umbilical area. Musculoskeletal:         General: No tenderness. Normal range of motion. Cervical back: Normal range of motion and neck supple. Skin:     General: Skin is warm and dry. Coloration: Skin is not pale. Findings: No erythema or rash. Neurological:      Mental Status: She is alert and oriented to person, place, and time. Cranial Nerves: No cranial nerve deficit. Psychiatric:         Behavior: Behavior normal.         Thought Content:  Thought content normal.         Judgment: Judgment normal.               Lab Results   Component Value Date     WBC 8.8 02/21/2022     HGB 14.3 02/21/2022     HCT 45.2 02/21/2022     MCV 88.3 02/21/2022      02/21/2022            Lab Results   Component Value Date      02/21/2022     K 4.4 02/21/2022     CL 98 02/21/2022     CO2 25 02/21/2022      Lab Results   Component Value Date     CREATININE 0.58 02/21/2022            Lab Results   Component Value Date     ALT 14 02/21/2022     AST 15 02/21/2022     ALKPHOS 65 02/21/2022     BILITOT 0.26 (L) 02/21/2022      No results found for: LIPASE         Patient Active Problem List   Diagnosis    Hypothyroidism    Cellulitis of leg, right    Hyperlipidemia    Chest pain at rest    H/O class III angina pectoris    Abnormal nuclear stress test         An electronic signature was used to authenticate this note.  Luz Ayon MD

## 2022-04-27 ENCOUNTER — HOSPITAL ENCOUNTER (OUTPATIENT)
Age: 47
Setting detail: SPECIMEN
Discharge: HOME | End: 2022-04-27

## 2022-04-27 LAB — TSH SERPL DL<=0.05 MIU/L-ACNC: 1.07 UIU/ML (ref 0.3–5)

## 2022-04-28 ENCOUNTER — ANESTHESIA (OUTPATIENT)
Dept: OPERATING ROOM | Age: 47
End: 2022-04-28
Payer: COMMERCIAL

## 2022-04-28 ENCOUNTER — ANESTHESIA EVENT (OUTPATIENT)
Dept: OPERATING ROOM | Age: 47
End: 2022-04-28
Payer: COMMERCIAL

## 2022-04-28 ENCOUNTER — HOSPITAL ENCOUNTER (OUTPATIENT)
Age: 47
Setting detail: OUTPATIENT SURGERY
Discharge: HOME | End: 2022-04-28
Attending: SURGERY | Admitting: SURGERY
Payer: COMMERCIAL

## 2022-04-28 VITALS — TEMPERATURE: 95.9 F | DIASTOLIC BLOOD PRESSURE: 73 MMHG | OXYGEN SATURATION: 100 % | SYSTOLIC BLOOD PRESSURE: 154 MMHG

## 2022-04-28 VITALS
DIASTOLIC BLOOD PRESSURE: 68 MMHG | TEMPERATURE: 96.9 F | HEIGHT: 66 IN | HEART RATE: 80 BPM | RESPIRATION RATE: 16 BRPM | SYSTOLIC BLOOD PRESSURE: 135 MMHG | OXYGEN SATURATION: 95 % | WEIGHT: 224 LBS | BODY MASS INDEX: 36 KG/M2

## 2022-04-28 DIAGNOSIS — K42.0 INCARCERATED UMBILICAL HERNIA: Primary | ICD-10-CM

## 2022-04-28 LAB
APTT: 32.4 SECONDS (ref 22–38)
GLUCOSE BLD-MCNC: 136 MG/DL (ref 70–108)
INR BLD: 0.99 (ref 0.85–1.13)
POTASSIUM SERPL-SCNC: 3.7 MEQ/L (ref 3.5–5.2)
PREGNANCY, URINE: NEGATIVE

## 2022-04-28 PROCEDURE — 7100000000 HC PACU RECOVERY - FIRST 15 MIN: Performed by: SURGERY

## 2022-04-28 PROCEDURE — 85730 THROMBOPLASTIN TIME PARTIAL: CPT

## 2022-04-28 PROCEDURE — 3700000001 HC ADD 15 MINUTES (ANESTHESIA): Performed by: SURGERY

## 2022-04-28 PROCEDURE — 85610 PROTHROMBIN TIME: CPT

## 2022-04-28 PROCEDURE — 2580000003 HC RX 258: Performed by: ANESTHESIOLOGY

## 2022-04-28 PROCEDURE — 7100000011 HC PHASE II RECOVERY - ADDTL 15 MIN: Performed by: SURGERY

## 2022-04-28 PROCEDURE — 3700000000 HC ANESTHESIA ATTENDED CARE: Performed by: SURGERY

## 2022-04-28 PROCEDURE — 6370000000 HC RX 637 (ALT 250 FOR IP)

## 2022-04-28 PROCEDURE — 84132 ASSAY OF SERUM POTASSIUM: CPT

## 2022-04-28 PROCEDURE — 7100000001 HC PACU RECOVERY - ADDTL 15 MIN: Performed by: SURGERY

## 2022-04-28 PROCEDURE — 7100000010 HC PHASE II RECOVERY - FIRST 15 MIN: Performed by: SURGERY

## 2022-04-28 PROCEDURE — 6360000002 HC RX W HCPCS: Performed by: NURSE ANESTHETIST, CERTIFIED REGISTERED

## 2022-04-28 PROCEDURE — 2500000003 HC RX 250 WO HCPCS: Performed by: SURGERY

## 2022-04-28 PROCEDURE — 6360000002 HC RX W HCPCS

## 2022-04-28 PROCEDURE — C1781 MESH (IMPLANTABLE): HCPCS | Performed by: SURGERY

## 2022-04-28 PROCEDURE — 2580000003 HC RX 258

## 2022-04-28 PROCEDURE — 81025 URINE PREGNANCY TEST: CPT

## 2022-04-28 PROCEDURE — 49653 PR LAP, VENTRAL HERNIA REPAIR,INCARCERATED: CPT | Performed by: SURGERY

## 2022-04-28 PROCEDURE — 2709999900 HC NON-CHARGEABLE SUPPLY: Performed by: SURGERY

## 2022-04-28 PROCEDURE — 36415 COLL VENOUS BLD VENIPUNCTURE: CPT

## 2022-04-28 PROCEDURE — 3600000019 HC SURGERY ROBOT ADDTL 15MIN: Performed by: SURGERY

## 2022-04-28 PROCEDURE — 6370000000 HC RX 637 (ALT 250 FOR IP): Performed by: SURGERY

## 2022-04-28 PROCEDURE — 2500000003 HC RX 250 WO HCPCS: Performed by: NURSE ANESTHETIST, CERTIFIED REGISTERED

## 2022-04-28 PROCEDURE — S2900 ROBOTIC SURGICAL SYSTEM: HCPCS | Performed by: SURGERY

## 2022-04-28 PROCEDURE — 82948 REAGENT STRIP/BLOOD GLUCOSE: CPT

## 2022-04-28 PROCEDURE — 6360000002 HC RX W HCPCS: Performed by: ANESTHESIOLOGY

## 2022-04-28 PROCEDURE — 3600000009 HC SURGERY ROBOT BASE: Performed by: SURGERY

## 2022-04-28 DEVICE — MESH HERN DIA4.5IN CIR W/ ECHO PS POS SYS VENTRALIGHT ST: Type: IMPLANTABLE DEVICE | Site: UMBILICAL | Status: FUNCTIONAL

## 2022-04-28 RX ORDER — OXYCODONE HYDROCHLORIDE 5 MG/1
5 TABLET ORAL EVERY 4 HOURS PRN
Status: CANCELLED | OUTPATIENT
Start: 2022-04-28

## 2022-04-28 RX ORDER — SODIUM CHLORIDE 0.9 % (FLUSH) 0.9 %
5-40 SYRINGE (ML) INJECTION PRN
Status: DISCONTINUED | OUTPATIENT
Start: 2022-04-28 | End: 2022-04-28 | Stop reason: HOSPADM

## 2022-04-28 RX ORDER — SODIUM CHLORIDE 9 MG/ML
INJECTION, SOLUTION INTRAVENOUS CONTINUOUS
Status: DISCONTINUED | OUTPATIENT
Start: 2022-04-28 | End: 2022-04-28 | Stop reason: HOSPADM

## 2022-04-28 RX ORDER — HYDROMORPHONE HCL 110MG/55ML
PATIENT CONTROLLED ANALGESIA SYRINGE INTRAVENOUS PRN
Status: DISCONTINUED | OUTPATIENT
Start: 2022-04-28 | End: 2022-04-28 | Stop reason: SDUPTHER

## 2022-04-28 RX ORDER — FENTANYL CITRATE 50 UG/ML
25 INJECTION, SOLUTION INTRAMUSCULAR; INTRAVENOUS EVERY 5 MIN PRN
Status: DISCONTINUED | OUTPATIENT
Start: 2022-04-28 | End: 2022-04-28 | Stop reason: HOSPADM

## 2022-04-28 RX ORDER — OXYCODONE HYDROCHLORIDE AND ACETAMINOPHEN 5; 325 MG/1; MG/1
1-2 TABLET ORAL EVERY 6 HOURS PRN
Qty: 25 TABLET | Refills: 0 | Status: SHIPPED | OUTPATIENT
Start: 2022-04-28 | End: 2022-05-01

## 2022-04-28 RX ORDER — SODIUM CHLORIDE 9 MG/ML
INJECTION, SOLUTION INTRAVENOUS PRN
Status: CANCELLED | OUTPATIENT
Start: 2022-04-28

## 2022-04-28 RX ORDER — MORPHINE SULFATE 2 MG/ML
2 INJECTION, SOLUTION INTRAMUSCULAR; INTRAVENOUS
Status: CANCELLED | OUTPATIENT
Start: 2022-04-28

## 2022-04-28 RX ORDER — SODIUM CHLORIDE 0.9 % (FLUSH) 0.9 %
5-40 SYRINGE (ML) INJECTION PRN
Status: CANCELLED | OUTPATIENT
Start: 2022-04-28

## 2022-04-28 RX ORDER — SODIUM CHLORIDE 0.9 % (FLUSH) 0.9 %
5-40 SYRINGE (ML) INJECTION EVERY 12 HOURS SCHEDULED
Status: CANCELLED | OUTPATIENT
Start: 2022-04-28

## 2022-04-28 RX ORDER — IPRATROPIUM BROMIDE AND ALBUTEROL SULFATE 2.5; .5 MG/3ML; MG/3ML
SOLUTION RESPIRATORY (INHALATION)
Status: COMPLETED
Start: 2022-04-28 | End: 2022-04-28

## 2022-04-28 RX ORDER — FENTANYL CITRATE 50 UG/ML
INJECTION, SOLUTION INTRAMUSCULAR; INTRAVENOUS PRN
Status: DISCONTINUED | OUTPATIENT
Start: 2022-04-28 | End: 2022-04-28 | Stop reason: SDUPTHER

## 2022-04-28 RX ORDER — KETOROLAC TROMETHAMINE 10 MG/1
10 TABLET, FILM COATED ORAL EVERY 8 HOURS PRN
Qty: 15 TABLET | Refills: 0 | Status: SHIPPED | OUTPATIENT
Start: 2022-04-28 | End: 2022-05-11 | Stop reason: ALTCHOICE

## 2022-04-28 RX ORDER — ONDANSETRON 2 MG/ML
4 INJECTION INTRAMUSCULAR; INTRAVENOUS EVERY 6 HOURS PRN
Status: CANCELLED | OUTPATIENT
Start: 2022-04-28

## 2022-04-28 RX ORDER — MORPHINE SULFATE 2 MG/ML
4 INJECTION, SOLUTION INTRAMUSCULAR; INTRAVENOUS
Status: CANCELLED | OUTPATIENT
Start: 2022-04-28

## 2022-04-28 RX ORDER — IPRATROPIUM BROMIDE AND ALBUTEROL SULFATE 2.5; .5 MG/3ML; MG/3ML
1 SOLUTION RESPIRATORY (INHALATION) ONCE
Status: COMPLETED | OUTPATIENT
Start: 2022-04-28 | End: 2022-04-28

## 2022-04-28 RX ORDER — SODIUM CHLORIDE 0.9 % (FLUSH) 0.9 %
5-40 SYRINGE (ML) INJECTION EVERY 12 HOURS SCHEDULED
Status: DISCONTINUED | OUTPATIENT
Start: 2022-04-28 | End: 2022-04-28 | Stop reason: HOSPADM

## 2022-04-28 RX ORDER — ROCURONIUM BROMIDE 10 MG/ML
INJECTION, SOLUTION INTRAVENOUS PRN
Status: DISCONTINUED | OUTPATIENT
Start: 2022-04-28 | End: 2022-04-28 | Stop reason: SDUPTHER

## 2022-04-28 RX ORDER — FENTANYL CITRATE 50 UG/ML
50 INJECTION, SOLUTION INTRAMUSCULAR; INTRAVENOUS EVERY 5 MIN PRN
Status: DISCONTINUED | OUTPATIENT
Start: 2022-04-28 | End: 2022-04-28 | Stop reason: HOSPADM

## 2022-04-28 RX ORDER — MIDAZOLAM HYDROCHLORIDE 1 MG/ML
INJECTION INTRAMUSCULAR; INTRAVENOUS PRN
Status: DISCONTINUED | OUTPATIENT
Start: 2022-04-28 | End: 2022-04-28 | Stop reason: SDUPTHER

## 2022-04-28 RX ORDER — OXYCODONE HYDROCHLORIDE 5 MG/1
10 TABLET ORAL EVERY 4 HOURS PRN
Status: CANCELLED | OUTPATIENT
Start: 2022-04-28

## 2022-04-28 RX ORDER — ONDANSETRON 4 MG/1
4 TABLET, ORALLY DISINTEGRATING ORAL EVERY 8 HOURS PRN
Status: CANCELLED | OUTPATIENT
Start: 2022-04-28

## 2022-04-28 RX ORDER — BUPIVACAINE HYDROCHLORIDE AND EPINEPHRINE 5; 5 MG/ML; UG/ML
INJECTION, SOLUTION EPIDURAL; INTRACAUDAL; PERINEURAL PRN
Status: DISCONTINUED | OUTPATIENT
Start: 2022-04-28 | End: 2022-04-28 | Stop reason: ALTCHOICE

## 2022-04-28 RX ORDER — LIDOCAINE HYDROCHLORIDE 20 MG/ML
INJECTION, SOLUTION INTRAVENOUS PRN
Status: DISCONTINUED | OUTPATIENT
Start: 2022-04-28 | End: 2022-04-28 | Stop reason: SDUPTHER

## 2022-04-28 RX ORDER — OXYCODONE HYDROCHLORIDE AND ACETAMINOPHEN 5; 325 MG/1; MG/1
TABLET ORAL
Status: DISCONTINUED
Start: 2022-04-28 | End: 2022-04-28 | Stop reason: HOSPADM

## 2022-04-28 RX ORDER — PROPOFOL 10 MG/ML
INJECTION, EMULSION INTRAVENOUS PRN
Status: DISCONTINUED | OUTPATIENT
Start: 2022-04-28 | End: 2022-04-28 | Stop reason: SDUPTHER

## 2022-04-28 RX ORDER — DEXAMETHASONE SODIUM PHOSPHATE 4 MG/ML
INJECTION, SOLUTION INTRA-ARTICULAR; INTRALESIONAL; INTRAMUSCULAR; INTRAVENOUS; SOFT TISSUE PRN
Status: DISCONTINUED | OUTPATIENT
Start: 2022-04-28 | End: 2022-04-28 | Stop reason: SDUPTHER

## 2022-04-28 RX ORDER — SODIUM CHLORIDE 9 MG/ML
INJECTION, SOLUTION INTRAVENOUS PRN
Status: DISCONTINUED | OUTPATIENT
Start: 2022-04-28 | End: 2022-04-28 | Stop reason: HOSPADM

## 2022-04-28 RX ORDER — ONDANSETRON 2 MG/ML
INJECTION INTRAMUSCULAR; INTRAVENOUS PRN
Status: DISCONTINUED | OUTPATIENT
Start: 2022-04-28 | End: 2022-04-28 | Stop reason: SDUPTHER

## 2022-04-28 RX ORDER — BUPIVACAINE HYDROCHLORIDE 5 MG/ML
INJECTION, SOLUTION EPIDURAL; INTRACAUDAL PRN
Status: DISCONTINUED | OUTPATIENT
Start: 2022-04-28 | End: 2022-04-28 | Stop reason: ALTCHOICE

## 2022-04-28 RX ORDER — OXYCODONE HYDROCHLORIDE AND ACETAMINOPHEN 5; 325 MG/1; MG/1
1 TABLET ORAL ONCE
Status: DISCONTINUED | OUTPATIENT
Start: 2022-04-28 | End: 2022-04-28 | Stop reason: HOSPADM

## 2022-04-28 RX ORDER — OXYCODONE HYDROCHLORIDE AND ACETAMINOPHEN 5; 325 MG/1; MG/1
2 TABLET ORAL ONCE
Status: COMPLETED | OUTPATIENT
Start: 2022-04-28 | End: 2022-04-28

## 2022-04-28 RX ADMIN — IPRATROPIUM BROMIDE AND ALBUTEROL SULFATE 1 AMPULE: 2.5; .5 SOLUTION RESPIRATORY (INHALATION) at 08:58

## 2022-04-28 RX ADMIN — PROPOFOL 150 MG: 10 INJECTION, EMULSION INTRAVENOUS at 07:34

## 2022-04-28 RX ADMIN — Medication 2000 MG: at 07:41

## 2022-04-28 RX ADMIN — OXYCODONE HYDROCHLORIDE AND ACETAMINOPHEN 2 TABLET: 5; 325 TABLET ORAL at 12:37

## 2022-04-28 RX ADMIN — HYDROMORPHONE HYDROCHLORIDE 1 MG: 2 INJECTION INTRAMUSCULAR; INTRAVENOUS; SUBCUTANEOUS at 07:55

## 2022-04-28 RX ADMIN — MIDAZOLAM 2 MG: 1 INJECTION INTRAMUSCULAR; INTRAVENOUS at 07:34

## 2022-04-28 RX ADMIN — LIDOCAINE HYDROCHLORIDE 100 MG: 20 INJECTION, SOLUTION INTRAVENOUS at 07:34

## 2022-04-28 RX ADMIN — SODIUM CHLORIDE: 9 INJECTION, SOLUTION INTRAVENOUS at 11:10

## 2022-04-28 RX ADMIN — ROCURONIUM BROMIDE 50 MG: 10 INJECTION INTRAVENOUS at 07:34

## 2022-04-28 RX ADMIN — SODIUM CHLORIDE: 9 INJECTION, SOLUTION INTRAVENOUS at 06:33

## 2022-04-28 RX ADMIN — FENTANYL CITRATE 100 MCG: 50 INJECTION, SOLUTION INTRAMUSCULAR; INTRAVENOUS at 07:34

## 2022-04-28 RX ADMIN — PROPOFOL 50 MG: 10 INJECTION, EMULSION INTRAVENOUS at 07:55

## 2022-04-28 RX ADMIN — SUGAMMADEX 200 MG: 100 INJECTION, SOLUTION INTRAVENOUS at 08:27

## 2022-04-28 RX ADMIN — DEXAMETHASONE SODIUM PHOSPHATE 4 MG: 4 INJECTION, SOLUTION INTRAMUSCULAR; INTRAVENOUS at 07:47

## 2022-04-28 RX ADMIN — FENTANYL CITRATE 25 MCG: 50 INJECTION, SOLUTION INTRAMUSCULAR; INTRAVENOUS at 09:15

## 2022-04-28 RX ADMIN — IPRATROPIUM BROMIDE AND ALBUTEROL SULFATE 1 AMPULE: .5; 3 SOLUTION RESPIRATORY (INHALATION) at 08:58

## 2022-04-28 RX ADMIN — SODIUM CHLORIDE: 9 INJECTION, SOLUTION INTRAVENOUS at 08:34

## 2022-04-28 RX ADMIN — ONDANSETRON 4 MG: 2 INJECTION INTRAMUSCULAR; INTRAVENOUS at 08:26

## 2022-04-28 RX ADMIN — FENTANYL CITRATE 25 MCG: 50 INJECTION, SOLUTION INTRAMUSCULAR; INTRAVENOUS at 09:05

## 2022-04-28 ASSESSMENT — PULMONARY FUNCTION TESTS
PIF_VALUE: 29
PIF_VALUE: 34
PIF_VALUE: 3
PIF_VALUE: 33
PIF_VALUE: 34
PIF_VALUE: 41
PIF_VALUE: 34
PIF_VALUE: 34
PIF_VALUE: 27
PIF_VALUE: 2
PIF_VALUE: 37
PIF_VALUE: 40
PIF_VALUE: 39
PIF_VALUE: 33
PIF_VALUE: 38
PIF_VALUE: 34
PIF_VALUE: 33
PIF_VALUE: 35
PIF_VALUE: 30
PIF_VALUE: 22
PIF_VALUE: 0
PIF_VALUE: 34
PIF_VALUE: 32
PIF_VALUE: 28
PIF_VALUE: 3
PIF_VALUE: 30
PIF_VALUE: 41
PIF_VALUE: 0
PIF_VALUE: 34
PIF_VALUE: 34
PIF_VALUE: 41
PIF_VALUE: 32
PIF_VALUE: 0
PIF_VALUE: 1
PIF_VALUE: 1
PIF_VALUE: 31
PIF_VALUE: 1
PIF_VALUE: 2
PIF_VALUE: 31
PIF_VALUE: 35
PIF_VALUE: 34
PIF_VALUE: 31
PIF_VALUE: 34
PIF_VALUE: 18
PIF_VALUE: 32
PIF_VALUE: 34
PIF_VALUE: 33
PIF_VALUE: 3
PIF_VALUE: 0
PIF_VALUE: 0
PIF_VALUE: 32
PIF_VALUE: 33
PIF_VALUE: 3
PIF_VALUE: 0
PIF_VALUE: 44
PIF_VALUE: 39
PIF_VALUE: 33
PIF_VALUE: 2
PIF_VALUE: 35
PIF_VALUE: 28
PIF_VALUE: 31
PIF_VALUE: 39
PIF_VALUE: 1
PIF_VALUE: 36
PIF_VALUE: 34
PIF_VALUE: 40
PIF_VALUE: 40
PIF_VALUE: 39
PIF_VALUE: 0
PIF_VALUE: 3
PIF_VALUE: 35
PIF_VALUE: 3
PIF_VALUE: 40
PIF_VALUE: 40

## 2022-04-28 ASSESSMENT — PAIN DESCRIPTION - DESCRIPTORS
DESCRIPTORS: SORE
DESCRIPTORS: SORE

## 2022-04-28 ASSESSMENT — PAIN DESCRIPTION - LOCATION
LOCATION: ABDOMEN
LOCATION: ABDOMEN

## 2022-04-28 ASSESSMENT — PAIN DESCRIPTION - PAIN TYPE
TYPE: SURGICAL PAIN
TYPE: SURGICAL PAIN

## 2022-04-28 ASSESSMENT — PAIN SCALES - GENERAL
PAINLEVEL_OUTOF10: 10
PAINLEVEL_OUTOF10: 6
PAINLEVEL_OUTOF10: 5
PAINLEVEL_OUTOF10: 5
PAINLEVEL_OUTOF10: 0
PAINLEVEL_OUTOF10: 0
PAINLEVEL_OUTOF10: 7

## 2022-04-28 ASSESSMENT — LIFESTYLE VARIABLES: SMOKING_STATUS: 1

## 2022-04-28 ASSESSMENT — PAIN DESCRIPTION - FREQUENCY
FREQUENCY: INTERMITTENT
FREQUENCY: INTERMITTENT

## 2022-04-28 ASSESSMENT — PAIN - FUNCTIONAL ASSESSMENT: PAIN_FUNCTIONAL_ASSESSMENT: 0-10

## 2022-04-28 NOTE — PROGRESS NOTES
Patient in Same Day with family present. Patient verified surgical and anesthesia consents. Patient arrived to OR with no hearing aides, dentures, jewelry, clothing or glasses.

## 2022-04-28 NOTE — ANESTHESIA POSTPROCEDURE EVALUATION
Department of Anesthesiology  Postprocedure Note    Patient: Lalit Monte  MRN: [id number]  YOB: 1975  Date of evaluation: 4/28/2022  Time:  11:26 AM     Procedure Summary     Date: 04/28/22 Room / Location: [address]    Anesthesia Start: 0729 Anesthesia Stop: 7488    Procedure: Robotic Umbilical Hernia Repair with Mesh (N/A Abdomen) Diagnosis: (Umbilical hernia)    Surgeons: Johann Ferro MD Responsible Provider: Tyree Dakin, DO    Anesthesia Type: general ASA Status: 3          Anesthesia Type: general    Courtney Phase I: Courtney Score: 8    Courtney Phase II:      Last vitals: Reviewed and per EMR flowsheets.        Anesthesia Post Evaluation    Patient location during evaluation: bedside  Patient participation: complete - patient participated  Level of consciousness: awake  Airway patency: patent  Nausea & Vomiting: no nausea  Complications: no  Cardiovascular status: hemodynamically stable  Respiratory status: acceptable  Hydration status: stable

## 2022-04-28 NOTE — BRIEF OP NOTE
Brief Postoperative Note      Patient: Francisco Whitfield  YOB: 1975  MRN: [id number]    Date of Procedure: 4/28/2022    Pre-Op Diagnosis: Incarcerated Umbilical hernia    Post-Op Diagnosis: Same       Procedure(s):  Robotic Umbilical Hernia Repair with Mesh    Surgeon(s):  Coty Richey MD    Assistant:  First Assistant: Praneeth Jackson RN    Anesthesia: General/local    Estimated Blood Loss (mL): 5 ml    Complications: None    Specimens:   * No specimens in log *    Implants:  Implant Name Type Inv. Item Serial No.  Lot No. LRB No. Used Action   MESH PAOLA DIA4. 5IN CIR W/ ECHO PS POS SYS [address]EN[address]IGHT [address] - [id number]  MESH PAOLA DIA4. 5IN CIR W/ ECHO PS POS SYS [address]ALIGHT [address]  E96 [employer]- [id number] N/A 1 Implanted         Drains: * No LDAs found *    Findings: as above - see op note for details    Electronically signed by Coty Richey MD on 4/28/2022 at 8:35 AM

## 2022-04-28 NOTE — ANESTHESIA PRE PROCEDURE
Department of Anesthesiology  Preprocedure Note       Name:  Duc Samuel   Age:  51 y.o.  :  1975                                          MRN:  [id number]         Date:  2022      Surgeon: Anyi Mercado):  Benson Mesa MD    Procedure: Procedure(s):  Robotic Umbilical Hernia Repair with Mesh    Medications prior to admission:   Prior to Admission medications    Medication Sig Start Date End Date Taking?  Authorizing Provider   ezetimibe (ZETIA) 10 MG tablet Take 10 mg by mouth daily    Historical Provider, MD   levothyroxine (SYNTHROID) 175 MCG tablet Take 175 mcg by mouth Daily    Historical Provider, MD   apixaban (ELIQUIS) 5 MG TABS tablet Take 1 tablet by mouth 2 times daily 21   Malika Cosme MD   metFORMIN (GLUCOPHAGE-XR) 500 MG extended release tablet Take 1 tablet by mouth 2 times daily 21   Historical Provider, MD   latanoprost (XALATAN) 0.005 % ophthalmic solution Apply 1 drop to eye nightly 21   Historical Provider, MD   docusate sodium (COLACE) 100 MG capsule Take 100 mg by mouth daily as needed for Constipation    Historical Provider, MD   ferrous sulfate (IRON 325) 325 (65 Fe) MG tablet Take 325 mg by mouth every other day     Historical Provider, MD   albuterol sulfate  (90 BASE) MCG/ACT inhaler Inhale 2 puffs into the lungs every 6 hours as needed for Wheezing     Historical Provider, MD   aspirin 81 MG chewable tablet Take 1 tablet by mouth daily 3/8/16   Malika Cosme MD   nitroGLYCERIN (NITROSTAT) 0.4 MG SL tablet Place 1 tablet under the tongue every 5 minutes as needed for Chest pain 3/8/16   Malika Cosme MD   losartan (COZAAR) 25 MG tablet Take 1 tablet by mouth daily 3/8/16   Malika Cosme MD   metoprolol (LOPRESSOR) 25 MG tablet Take 1 tablet by mouth 2 times daily 3/8/16   Malika Cosme MD   hydrochlorothiazide (HYDRODIURIL) 25 MG tablet Take 25 mg by mouth daily    Historical Provider, MD   acetaminophen (TYLENOL) 325 MG tablet Take 650 mg by mouth every 6 hours as needed. Historical Provider, MD   pravastatin (PRAVACHOL) 40 MG tablet Take 80 mg by mouth daily     Historical Provider, MD   therapeutic multivitamin-minerals (THERAGRAN-M) tablet Take 1 tablet by mouth daily. Historical Provider, MD       Current medications:    Current Facility-Administered Medications   Medication Dose Route Frequency Provider Last Rate Last Admin    0.9 % sodium chloride infusion   IntraVENous Continuous Haylee Magallanes, CORBINN 741 mL/hr at 04/81/48 0633 New Bag at 04/28/22 5389    ceFAZolin (ANCEF) 2000 mg in sterile water 20 mL IV syringe  2,000 mg IntraVENous 30 Min Pre-Op Crystal Camper, LPN           Allergies:     Allergies   Allergen Reactions    Vicodin [Hydrocodone-Acetaminophen] Anxiety       Problem List:    Patient Active Problem List   Diagnosis Code    Hypothyroidism E03.9    Cellulitis of leg, right L03.115    Hyperlipidemia E78.5    Chest pain at rest R07.9    H/O class III angina pectoris Z86.79    Abnormal nuclear stress test R94.39       Past Medical History:        Diagnosis Date    Asthma     Backache     COPD [facility]ch[facility] obstructive [facility]) [facility]     Sheri Bey    Coronary artery disease     Dr. Charito Deal Disease of blood and blood forming organ     anemia    Environmental allergies     Hyperlipidemia     Hypertension     Hypothyroidism     Type 2 diabetes mellitus ([facility] [address]        Past Surgical History:        Procedure Laterality Date    COLONOSCOPY      CORONARY [facility]LAST[facility]ACEMENT  2016    [facility]    MOUTH SURGERY  2014    teeth removed    SC COLONOSCOPY FLX DX W/COLLJ SPEC WHEN PFRMD N/A 06/20/2018    COL[facility]    SC EGD TRANSORAL BIOPSY SINGLE/MULTIPLE N/A 06/20/2018    [facility]         Social History:    Social History     Tobacco Use    Smoking status: Current Every Day Smoker     Packs/day: 0.50     Types: Cigarettes    Smokeless tobacco: Never Used   Substance Use Topics    Alcohol use: Yes     Comment: occasionally                                Ready to quit: Not Answered  Counseling given: Not Answered      Vital Signs (Current):   Vitals:    04/28/22 0603   BP: 131/72   Pulse: 72   Resp: 20   Temp: 97.1 °F (36.2 °C)   TempSrc: Tympanic   SpO2: 99%   Weight: 224 lb (101.6 kg)   Height: 5' 6\" (1.676 m)                                              BP Readings from Last 3 Encounters:   04/28/22 131/72   03/30/22 122/78   08/25/21 122/68       NPO Status: Time of last liquid consumption: 0000                        Time of last solid consumption: 0000                        Date of last liquid consumption: 04/28/22                        Date of last solid food consumption: 04/28/22    BMI:   Wt Readings from Last 3 Encounters:   04/28/22 224 lb (101.6 kg)   03/30/22 222 lb (100.7 kg)   08/25/21 225 lb (102.1 kg)     Body mass index is 36.15 kg/m².     CBC:   Lab Results   Component Value Date    WBC 8.8 02/21/2022    RBC 5.12 02/21/2022    HGB 14.3 02/21/2022    HCT 45.2 02/21/2022    MCV 88.3 02/21/2022    RDW 15.2 02/21/2022     02/21/2022       CMP:   Lab Results   Component Value Date     02/21/2022    K 3.7 04/28/2022    K 4.4 08/25/2021    CL 98 02/21/2022    CO2 25 02/21/2022    BUN 13 02/21/2022    CREATININE 0.58 02/21/2022    GFRAA >60 02/21/2022    LABGLOM >60 02/21/2022    LABGLOM >90 08/25/2021    GLUCOSE 114 02/21/2022    PROT 7.2 02/21/2022    CALCIUM 9.7 02/21/2022    BILITOT 0.26 02/21/2022    ALKPHOS 65 02/21/2022    AST 15 02/21/2022    ALT 14 02/21/2022       POC Tests:   Recent Labs     04/28/22  0626   POCGLU 136*       Coags:   Lab Results   Component Value Date    INR 0.99 04/28/2022    APTT 32.4 04/28/2022       HCG (If Applicable):   Lab Results   Component Value Date    PREGTESTUR negative 04/28/2022    PREGSERUM NEGATIVE 08/25/2021        ABGs: No results found for: PHART, PO2ART, YCN5LFC, JZI4QTI, BEART, D7SYRMML     Type & Screen (If Applicable):  Lab Results   Component Value Date    LABRH NEG 08/25/2021       Drug/Infectious Status (If Applicable):  No results found for: HIV, HEPCAB    COVID-19 Screening (If Applicable): No results found for: COVID19        Anesthesia Evaluation  Patient summary reviewed and Nursing notes reviewed no history of anesthetic complications:   Airway: Mallampati: II        Dental:          Pulmonary: breath sounds clear to auscultation  (+) COPD:  asthma: current smoker          Patient smoked on day of surgery. Cardiovascular:  Exercise tolerance: poor (<4 METS),   (+) hypertension:, CAD:,       ECG reviewed  Rhythm: regular  Rate: normal                    Neuro/Psych:               GI/Hepatic/Renal:             Endo/Other:    (+) Diabetes, hypothyroidism::., .                 Abdominal:       Abdomen: soft. Vascular: Other Findings:             Anesthesia Plan      general     ASA 3       Induction: intravenous. MIPS: Postoperative opioids intended and Prophylactic antiemetics administered. Anesthetic plan and risks discussed with patient. Plan discussed with CRNA.                   Lefty Priest DO   4/28/2022

## 2022-04-28 NOTE — PROGRESS NOTES
7839- Pt arrived to PACU drowsy with no s/s of distress upon arrival. VSS.    0548- Call placed to Dr. Brenda Carey at this time and PACU orders requested. Physician to place orders. Also received a telephone order for Duoneb treatment for wheezing. 9677- Duoneb treatment administered at this time. 5121- Pt medicated with PRN Fentanyl at this time for c/o surgical pain to abdomen. 0115- Pt medicated with PRN Fentanyl at this time for c/o surgical pain to abdomen. Pt resting at this time with eyes closed and no s/s of distress. Lung sounds CTA post-op Duoneb treatment. No wheezing noted. 7740- Pt transferred to same day surgery at this time in stable condition. Handoff given to [employer].

## 2022-04-28 NOTE — PROGRESS NOTES
Pt admitted to [facility] room 8 and oriented to unit. SCD sleeves applied. Nares swabbed. Pt verbalized permission for first name, last initial and physicians name on white board. SDS board and discharge criteria explained, pt and family verbalized understanding. Pt denies thoughts of harming self or others. Call light in reach. Family at the bedside.

## 2022-04-28 NOTE — PROGRESS NOTES
Patient very drowsy, opens eyes briefly when spoken to but then falls back to sleep. Patient wakes up and states she wants pain medication, but cannot stay awake. Oxygen 95% on 3L at this time.

## 2022-04-28 NOTE — PROGRESS NOTES
Patient returned to SDS 8 from PACU, drowsy, opens eyes to name, falls back to sleep quickly.  at bedside and educated on discharge criteria, he voices good understanding. Patient provided gingerale and jello. Call light within reach.

## 2022-04-28 NOTE — INTERVAL H&P NOTE
Update History & Physical    The patient's History and Physical was reviewed with the patient and I examined the patient. There was no change. The surgical site was confirmed by the patient and me. Plan: The risks, benefits, expected outcome, and alternative to the recommended procedure have been discussed with the patient. Patient understands and wants to proceed with the procedure. The patient was counseled at length about the risks of aldo Covid-19 during their perioperative period and any recovery window from their procedure. The patient was made aware that aldo Covid-19  may worsen their prognosis for recovering from their procedure  and lend to a higher morbidity and/or mortality risk. All material risks, benefits, and reasonable alternatives including postponing the procedure were discussed. The patient does wish to proceed with the procedure at this time.     Electronically signed by Karyn Calixto MD on 4/28/2022 at 6:51 AM

## 2022-04-28 NOTE — OP NOTE
[address] [place] [address]                                OPERATIVE REPORT    PATIENT NAME: Marck Saldana                    :        1975  MED REC NO:   [id number]                           ROOM:  ACCOUNT NO:   [de-identified]                           ADMIT DATE: 2022  PROVIDER:     MAREK Garcia OF PROCEDURE:  2022    PREOPERATIVE DIAGNOSIS:  Incarcerated umbilical hernia. POSTOPERATIVE DIAGNOSIS  Incarcerated umbilical hernia. PROCEDURES:  Robotic repair of incarcerated umbilical hernia with mesh  (11 cm round Ventralight mesh). SURGEON:  Belinda Ruby MD    ASSISTANT:  Madhavi Antonio. [facility]    ANESTHESIA:  General/local.    ESTIMATED BLOOD LOSS:  5 mL. DRAINS:  None. COMPLICATIONS:  None. DISPOSITION:  Stable to the recovery room. INDICATIONS:  The patient is a 76-year-old female who I had seen in the  office secondary to an incarcerated umbilical hernia. Both operative  and nonoperative intervention plans were discussed. Risks of surgery  were further discussed. Some of the risks included but were not limited  to bleeding, infection, the need for reoperation, severe chronic  postoperative pain or numbness, major vascular or nerve injury,  cardiopulmonary complications, anesthetic complications, seroma or  hematoma formation, wound breakdown, trocar site herniation, recurrence  of the hernia, chronic abdominal pain, mesh infection requiring removal  of the mesh and death. After all of the questions were answered in  their entirety and the patient was completely aware of the current  situation, she wished to proceed with surgery. DESCRIPTION OF THE PROCEDURE:  After informed consent was signed and  placed on the chart, the patient was taken back to the operating room  and placed supine on the operating room table. General anesthesia was  induced.   She tolerated this well throughout the case. All pressure  points were padded. She was on preoperative antibiotics. Bilateral  lower extremity sequential compression devices were placed prior to  incision. Her abdomen and pelvis were prepped and draped in usual  sterile standard fashion. A timeout occurred prior to the operation  which not only identified the patient, but also the planned procedure to  be performed. At the end of the timeout, there were no questions or  concerns. I began the operation by making a small skin nick at Alonzo's point. Veress needle was inserted. Intraabdominal cavity was insufflated to a  pressure of approximately 15 mmHg with carbon dioxide gas. The patient  tolerated insufflation well. An 8-mm trocar was placed over on the left  side of the abdomen. Laparoscope was inserted. Upon initial  evaluation, there was no hollow viscus, solid organ or major vascular  injury with the Veress needle insertion or the first trocar placement. Another 8-mm trocar was then placed further on the left lateral lower  quadrant and then a 12-mm was then placed in the left upper lateral  quadrant after the skin nick site at Alonzo's point was extended. Robot  brought in and docked. Instruments placed under direct vision. Once  everything was aligned and in order, I then unscrubbed and went back to  the console. I began the operation first by evaluating the abdomen briefly and there  appeared to be no abnormal findings except for an enlarged uterus with  some fibroids. Umbilicus had the defect of about 3 cm in diameter. There was incarcerated omentum which was taken down with sharp and blunt  dissection. A small section had to be taken and then this was removed  at the end of the case through the large trocar site and discarded. Pressure was decreased down to 10 and at that point, the defect was  primarily reapproximated in the midline with a running #1 Stratafix  barbed suture x2.   This easily reapproximated in the midline. An 11-cm  wound Ventralight with the Echo insufflation system was brought in and  then this was brought up as an underlay. This was secured up to the  undersurface of the anterior abdominal wall with a 2-0 double-armed  Stratafix barbed suture. This was started inferiorly and then each arm  was ran in the opposite direction heading superiorly and then tied. The  Echo insufflation system was then removed. The mesh was nice and tight. It completely covered the primarily repaired defect with adequate  overlap with the mesh on to good healthy fascia. No other abnormalities  were identified at that time and then the instruments were removed,  robot undocked. I scrubbed back into the case. Large trocar site was  closed at the fascial level with a Vicryl suture on a StorCX suture  passer. At the completion of this, there were no fascial defects. Subcutaneous tissues were irrigated. Hemostasis was adequate. The  patient tolerated desufflation well. Skin reapproximated at all the  incisional sites with 4-0 Vicryl in a subcuticular fashion. Closed  incisions were then cleaned, dried, and Steri-Strips applied. Dry  sterile dressings applied. Sponge, needle, and instrumentation count  was correct at the end of the procedure. The patient tolerated the  procedure well with no apparent complications and only about 5 mL of  blood loss. She was able to be brought out of general anesthesia and  transferred to postanesthesia care unit in stable condition.         Bandar Gallardo M.D.    D: 04/28/2022 8:54:11       T: 04/28/2022 12:35:08     [email or web address]  Job#: [id number]     Doc#: [id number]    CC:

## 2022-04-29 ENCOUNTER — TELEPHONE (OUTPATIENT)
Dept: SURGERY | Age: 47
End: 2022-04-29

## 2022-04-29 NOTE — TELEPHONE ENCOUNTER
Called to check on patient-states she is doing well-Urinating fine No nausea or vomiting. Taking pain meds but sore. Instructed to call with any questions or concerns.

## 2022-05-05 RX ORDER — ROSUVASTATIN CALCIUM 40 MG/1
40 TABLET, COATED ORAL EVERY EVENING
COMMUNITY

## 2022-05-11 ENCOUNTER — OFFICE VISIT (OUTPATIENT)
Dept: SURGERY | Age: 47
End: 2022-05-11

## 2022-05-11 VITALS
WEIGHT: 232.2 LBS | HEIGHT: 66 IN | DIASTOLIC BLOOD PRESSURE: 78 MMHG | HEART RATE: 67 BPM | RESPIRATION RATE: 18 BRPM | BODY MASS INDEX: 37.32 KG/M2 | OXYGEN SATURATION: 98 % | SYSTOLIC BLOOD PRESSURE: 128 MMHG | TEMPERATURE: 98.4 F

## 2022-05-11 DIAGNOSIS — Z09 S/P UMBILICAL HERNIA REPAIR, FOLLOW-UP EXAM: Primary | ICD-10-CM

## 2022-05-11 PROCEDURE — 99024 POSTOP FOLLOW-UP VISIT: CPT | Performed by: NURSE PRACTITIONER

## 2022-05-11 NOTE — LETTER
[address] [facility]  [place] [address] [address] [facility] [address]  Phone: [phone]  Fax: [phone]    BHUPINDER Bailey CNP        May 11, 2022     Patient: Jen Huston   YOB: 1975   Date of Visit: 5/11/2022       To Whom It May Concern: It is my medical opinion that Stacie Webb may return 5/31/22 with restrictions of no lifting, pulling, or tugging over 25 lbs until 6/15/22. After that date she may return to normal duties without restrictions. If you have any questions or concerns, please don't hesitate to call.     Sincerely,        BHUPINDER Bailey CNP

## 2022-05-11 NOTE — PROGRESS NOTES
[address] [facility]  [address] [facility] [address]  Dept: [phone]  Dept Fax: [phone]  Loc: [phone]    Visit Date: 5/11/2022    Yael Tomas is a 51 y.o. female who presents today for:  Chief Complaint   Patient presents with    Post-Op Check     s/p  Robotic repair of incarcerated umbilical hernia with mesh (11 cm round Ventralight mesh)-4/28/2022     HPI:     GERALDINE Martinez is a 52-year old female patient who presents today for follow-up status post robotic repair of incarcerated umbilical hernia with mesh 2 weeks ago. Patient is doing well. Off narcotics. Still has some incisional soreness but minimal.  Tolerating regular diet without any nausea or vomiting. Denies any issues with constipation or diarrhea. Incisions are healing fairly well. She does have some erythema to left upper and left mid incision. No dehiscence or breakdown, but patient keeping band-aid to area and there is some moistness. No signs of infection at this time. No fevers or chills. Urinating without difficulties. Denies any shortness of breath or chest pain. Tolerating increased activity well.     Past Medical History:   Diagnosis Date    Asthma     Backache     COPD (chronic obstructive pulmonary disease) (HCC)     Maksim Goodness    Coronary artery disease     Dr. Royce West Disease of blood and blood forming organ     anemia    Environmental allergies     Hyperlipidemia     Hypertension     Hypothyroidism     Type 2 diabetes mellitus ([facility] [address]       Past Surgical History:   Procedure Laterality Date    COLONOSCOPY      CORONARY ANGIOPLASTY WITH STENT PLACEMENT  2016    Psychiatric    HERNIA REPAIR N/A 6/30/5620    Robotic Umbilical Hernia Repair with Mesh performed by Farshad Ramírez MD at [address] [facility]   2014    teeth removed    IN COLONOSCOPY FLX DX W/COLLJ SPEC WHEN PFRMD N/A 06/20/2018    COLONOSCOPY performed by Nelly De Guzman Reuben Srinivasan MD at [address] [facility]    TX EGD TRANSORAL BIOPSY SINGLE/MULTIPLE N/A 06/20/2018    EGD performed by Jamaica Tai MD at [address] [facility]    UPPER GASTROINTESTINAL ENDOSCOPY         Family History   Problem Relation Age of Onset    Thyroid Disease Mother     Heart Disease Mother     Cancer Father     Diabetes Father     High Blood Pressure Father     Asthma Brother        Social History     Tobacco Use    Smoking status: Current Every Day Smoker     Packs/day: 0.50     Types: Cigarettes    Smokeless tobacco: Never Used   Substance Use Topics    Alcohol use: Yes     Comment: occasionally      Current Outpatient Medications   Medication Sig Dispense Refill    rosuvastatin (CRESTOR) 40 MG tablet Take 40 mg by mouth every evening      Multiple Vitamin (MULTIVITAMIN ADULT PO) Take by mouth      Fe Bisgly-Succ-C-Thre-B12-FA (IRON-150 PO) Take by mouth      Loratadine (CLARITIN PO) Take by mouth      ezetimibe (ZETIA) 10 MG tablet Take 10 mg by mouth daily      levothyroxine (SYNTHROID) 175 MCG tablet Take 175 mcg by mouth Daily      apixaban (ELIQUIS) 5 MG TABS tablet Take 1 tablet by mouth 2 times daily 180 tablet 1    metFORMIN (GLUCOPHAGE-XR) 500 MG extended release tablet Take 1 tablet by mouth 2 times daily      latanoprost (XALATAN) 0.005 % ophthalmic solution Apply 1 drop to eye nightly      docusate sodium (COLACE) 100 MG capsule Take 100 mg by mouth daily as needed for Constipation      ferrous sulfate (IRON 325) 325 (65 Fe) MG tablet Take 325 mg by mouth every other day       albuterol sulfate  (90 BASE) MCG/ACT inhaler Inhale 2 puffs into the lungs every 6 hours as needed for Wheezing       aspirin 81 MG chewable tablet Take 1 tablet by mouth daily 30 tablet 3    nitroGLYCERIN (NITROSTAT) 0.4 MG SL tablet Place 1 tablet under the tongue every 5 minutes as needed for Chest pain 25 tablet 3    losartan (COZAAR) 25 MG tablet Take 1 tablet by mouth daily 30 tablet 3    metoprolol (LOPRESSOR) 25 MG tablet Take 1 tablet by mouth 2 times daily 60 tablet 3    hydrochlorothiazide (HYDRODIURIL) 25 MG tablet Take 25 mg by mouth daily      acetaminophen (TYLENOL) 325 MG tablet Take 650 mg by mouth every 6 hours as needed.  pravastatin (PRAVACHOL) 40 MG tablet Take 80 mg by mouth daily       therapeutic multivitamin-minerals (THERAGRAN-M) tablet Take 1 tablet by mouth daily. No current facility-administered medications for this visit. Allergies   Allergen Reactions    Vicodin [Hydrocodone-Acetaminophen] Anxiety       Subjective:     Review of Systems   Constitutional: Negative for activity change, appetite change, chills, diaphoresis, fatigue, fever and unexpected weight change. HENT: Negative for congestion, dental problem, hearing loss, rhinorrhea, sinus pressure and sore throat. Eyes: Negative for photophobia, pain, discharge, itching and visual disturbance. Respiratory: Negative for apnea, cough, choking, chest tightness, shortness of breath and wheezing. Cardiovascular: Negative for chest pain, palpitations and leg swelling. Gastrointestinal: Positive for abdominal pain. Negative for abdominal distention, anal bleeding, blood in stool, constipation, diarrhea, nausea and vomiting. Endocrine: Negative. Genitourinary: Negative for decreased urine volume, difficulty urinating, dysuria, frequency and urgency. Musculoskeletal: Negative for arthralgias, back pain, gait problem, joint swelling, myalgias and neck pain. Skin: Positive for wound. Negative for color change, pallor and rash. Allergic/Immunologic: Negative. Neurological: Negative for dizziness, tremors, weakness, numbness and headaches. Hematological: Negative. Psychiatric/Behavioral: Negative.       Objective:   /78 (Site: Right Upper Arm, Position: Sitting, Cuff Size: Medium Adult)   Pulse 67   Temp 98.4 °F (36.9 °C) (Temporal)   Resp 18   Ht 5' 6\" (1.676 m)   Wt 232 lb 3.2 oz (105.3 kg)   SpO2 98%   BMI 37.48 kg/m²     Physical Exam  Vitals reviewed. Constitutional:       General: She is not in acute distress. Appearance: Normal appearance. She is well-developed. She is not ill-appearing or toxic-appearing. HENT:      Head: Normocephalic and atraumatic. Right Ear: Hearing and external ear normal.      Left Ear: Hearing and external ear normal.      Nose: Nose normal.      Mouth/Throat:      Mouth: Mucous membranes are not pale, not dry and not cyanotic. Eyes:      General: Lids are normal.   Neck:      Trachea: Trachea and phonation normal.   Cardiovascular:      Rate and Rhythm: Normal rate and regular rhythm. Pulses: Normal pulses. Heart sounds: S1 normal and S2 normal.   Pulmonary:      Effort: Pulmonary effort is normal. No tachypnea, bradypnea, accessory muscle usage or respiratory distress. Breath sounds: Normal breath sounds. No decreased breath sounds, wheezing or rales. Chest:      Chest wall: No tenderness. Abdominal:      General: Bowel sounds are normal. There is no distension. Palpations: Abdomen is soft. There is no mass. Tenderness: There is abdominal tenderness. Musculoskeletal:         General: No tenderness. Normal range of motion. Cervical back: Normal range of motion and neck supple. Skin:     General: Skin is warm and dry. Findings: No abrasion, bruising, burn, ecchymosis, erythema, laceration, lesion or rash. Neurological:      Mental Status: She is alert and oriented to person, place, and time. Motor: No tremor, atrophy or abnormal muscle tone. Coordination: Coordination normal.      Gait: Gait normal.      Deep Tendon Reflexes: Reflexes are normal and symmetric. Psychiatric:         Speech: Speech normal.         Behavior: Behavior normal.         Thought Content:  Thought content normal.        Patient Active Problem List   Diagnosis    Hypothyroidism    Cellulitis of leg, right    Hyperlipidemia    Chest pain at rest    H/O class III angina pectoris    Abnormal nuclear stress test    Incarcerated umbilical hernia     Assessment:     1. Status post robotic repair of incarcerated umbilical hernia with mesh  2. Obesity (BMI 37)    Plan:     1. Continue to keep incisions clean and dry. Band-aids during the day but okay to leave open to air if possible. No ointments or lotions. No signs of infection right now. 2. No bulge or instability noted at old hernia site  3. Wear abdominal binder for comfort. Off and on as needed throughout the day. 4. Off narcotics. Tylenol as needed for discomfort. 5. Lifting/activity restrictions discussed with patient. Questions answered. Off work for 4 weeks from surgery and then return with 2 weeks restrictions on 31st.  6. Follow up as needed. Signs and symptoms reviewed with patient that would be concerning and need her to return to office for re-evaluation. Patient states she will call if she has questions or concerns.       Electronically signed by BHUPINDER Molina CNP on 5/11/2022 at 2:54 PM

## 2022-05-12 ASSESSMENT — ENCOUNTER SYMPTOMS
VOMITING: 0
WHEEZING: 0
CHEST TIGHTNESS: 0
EYE DISCHARGE: 0
BLOOD IN STOOL: 0
COUGH: 0
SHORTNESS OF BREATH: 0
ABDOMINAL DISTENTION: 0
DIARRHEA: 0
SINUS PRESSURE: 0
APNEA: 0
SORE THROAT: 0
ALLERGIC/IMMUNOLOGIC NEGATIVE: 1
BACK PAIN: 0
ANAL BLEEDING: 0
ABDOMINAL PAIN: 1
PHOTOPHOBIA: 0
COLOR CHANGE: 0
CHOKING: 0
CONSTIPATION: 0
EYE ITCHING: 0
RHINORRHEA: 0
EYE PAIN: 0
NAUSEA: 0

## 2022-08-04 ENCOUNTER — HOSPITAL ENCOUNTER (OUTPATIENT)
Age: 47
Setting detail: SPECIMEN
Discharge: HOME | End: 2022-08-04

## 2022-08-04 LAB
ALBUMIN SERPL-MCNC: 4.7 G/DL (ref 3.5–5.2)
ALBUMIN/GLOBULIN RATIO: 1.5 (ref 1–2.5)
ALP BLD-CCNC: 69 U/L (ref 35–104)
ALT SERPL-CCNC: 21 U/L (ref 5–33)
AST SERPL-CCNC: 24 U/L
BILIRUB SERPL-MCNC: 0.43 MG/DL (ref 0.3–1.2)
BILIRUBIN DIRECT: 0.1 MG/DL
BILIRUBIN, INDIRECT: 0.33 MG/DL (ref 0–1)
CHOLESTEROL/HDL RATIO: 3.2
CHOLESTEROL: 100 MG/DL
HDLC SERPL-MCNC: 31 MG/DL
LDL CHOLESTEROL: 29 MG/DL (ref 0–130)
THYROXINE, FREE: 2.23 NG/DL (ref 0.93–1.7)
TOTAL PROTEIN: 7.9 G/DL (ref 6.4–8.3)
TRIGL SERPL-MCNC: 202 MG/DL
TSH SERPL DL<=0.05 MIU/L-ACNC: 1.27 UIU/ML (ref 0.3–5)

## 2022-09-14 ENCOUNTER — HOSPITAL ENCOUNTER (OUTPATIENT)
Age: 47
Setting detail: SPECIMEN
Discharge: HOME | End: 2022-09-14

## 2022-09-14 LAB — PRO-BNP: 115 PG/ML

## 2022-10-03 ENCOUNTER — APPOINTMENT (OUTPATIENT)
Dept: CT IMAGING | Age: 47
DRG: 760 | End: 2022-10-03
Payer: COMMERCIAL

## 2022-10-03 ENCOUNTER — HOSPITAL ENCOUNTER (INPATIENT)
Age: 47
LOS: 2 days | Discharge: HOME | DRG: 760 | End: 2022-10-05
Attending: EMERGENCY MEDICINE | Admitting: INTERNAL MEDICINE
Payer: COMMERCIAL

## 2022-10-03 DIAGNOSIS — D50.8 OTHER IRON DEFICIENCY ANEMIA: ICD-10-CM

## 2022-10-03 DIAGNOSIS — N85.8 UTERINE MASS: ICD-10-CM

## 2022-10-03 DIAGNOSIS — R06.02 SHORTNESS OF BREATH: ICD-10-CM

## 2022-10-03 DIAGNOSIS — D64.9 ANEMIA, UNSPECIFIED TYPE: Primary | ICD-10-CM

## 2022-10-03 PROBLEM — R10.9 ABDOMINAL PAIN: Status: ACTIVE | Noted: 2022-10-03

## 2022-10-03 LAB
ABO: NORMAL
ABSOLUTE RETIC #: 84 THOU/MM3 (ref 20–115)
ALBUMIN SERPL-MCNC: 3.3 G/DL (ref 3.5–5.1)
ALP BLD-CCNC: 68 U/L (ref 38–126)
ALT SERPL-CCNC: 10 U/L (ref 11–66)
ANION GAP SERPL CALCULATED.3IONS-SCNC: 12 MEQ/L (ref 8–16)
ANTIBODY SCREEN: NORMAL
APTT: 31.9 SECONDS (ref 22–38)
AST SERPL-CCNC: 12 U/L (ref 5–40)
BASOPHILS # BLD: 0.6 %
BASOPHILS ABSOLUTE: 0.1 THOU/MM3 (ref 0–0.1)
BILIRUB SERPL-MCNC: 0.3 MG/DL (ref 0.3–1.2)
BILIRUBIN URINE: NEGATIVE
BLOOD, URINE: NEGATIVE
BUN BLDV-MCNC: 12 MG/DL (ref 7–22)
CALCIUM SERPL-MCNC: 9.5 MG/DL (ref 8.5–10.5)
CEA: 1 NG/ML (ref 0–5)
CHARACTER, URINE: CLEAR
CHLORIDE BLD-SCNC: 96 MEQ/L (ref 98–111)
CO2: 28 MEQ/L (ref 23–33)
COLOR: YELLOW
CREAT SERPL-MCNC: 0.5 MG/DL (ref 0.4–1.2)
EKG ATRIAL RATE: 82 BPM
EKG P AXIS: 56 DEGREES
EKG P-R INTERVAL: 134 MS
EKG Q-T INTERVAL: 396 MS
EKG QRS DURATION: 80 MS
EKG QTC CALCULATION (BAZETT): 462 MS
EKG R AXIS: 49 DEGREES
EKG T AXIS: 48 DEGREES
EKG VENTRICULAR RATE: 82 BPM
EOSINOPHIL # BLD: 2.3 %
EOSINOPHILS ABSOLUTE: 0.2 THOU/MM3 (ref 0–0.4)
ERYTHROCYTE [DISTWIDTH] IN BLOOD BY AUTOMATED COUNT: 15.7 % (ref 11.5–14.5)
ERYTHROCYTE [DISTWIDTH] IN BLOOD BY AUTOMATED COUNT: 49.9 FL (ref 35–45)
FERRITIN: 816 NG/ML (ref 10–291)
GFR SERPL CREATININE-BSD FRML MDRD: > 90 ML/MIN/1.73M2
GLUCOSE BLD-MCNC: 131 MG/DL (ref 70–108)
GLUCOSE URINE: NEGATIVE MG/DL
HCG,BETA SUBUNIT,QUAL,SERUM: < 1 MIU/ML (ref 0–5)
HCT VFR BLD CALC: 24 % (ref 37–47)
HEMOGLOBIN: 7.4 GM/DL (ref 12–16)
IMMATURE GRANS (ABS): 0.12 THOU/MM3 (ref 0–0.07)
IMMATURE GRANULOCYTES: 1.2 %
IMMATURE RETIC FRACT: 42.1 % (ref 3–15.9)
INR BLD: 1.24 (ref 0.85–1.13)
IRON: 34 UG/DL (ref 50–170)
KETONES, URINE: NEGATIVE
LD: 418 U/L (ref 100–190)
LEUKOCYTE ESTERASE, URINE: NEGATIVE
LIPASE: 27 U/L (ref 5.6–51.3)
LYMPHOCYTES # BLD: 23 %
LYMPHOCYTES ABSOLUTE: 2.3 THOU/MM3 (ref 1–4.8)
MCH RBC QN AUTO: 27.1 PG (ref 26–33)
MCHC RBC AUTO-ENTMCNC: 30.8 GM/DL (ref 32.2–35.5)
MCV RBC AUTO: 87.9 FL (ref 81–99)
MONOCYTES # BLD: 7.3 %
MONOCYTES ABSOLUTE: 0.7 THOU/MM3 (ref 0.4–1.3)
NITRITE, URINE: NEGATIVE
NUCLEATED RED BLOOD CELLS: 1 /100 WBC
OSMOLALITY CALCULATION: 273.5 MOSMOL/KG (ref 275–300)
PH UA: 8.5 (ref 5–9)
PLATELET # BLD: 507 THOU/MM3 (ref 130–400)
PMV BLD AUTO: 10.5 FL (ref 9.4–12.4)
POTASSIUM REFLEX MAGNESIUM: 3.9 MEQ/L (ref 3.5–5.2)
PROTEIN UA: NEGATIVE
RBC # BLD: 2.73 MILL/MM3 (ref 4.2–5.4)
RETIC HEMOGLOBIN: 20.6 PG (ref 28.2–35.7)
RETICULOCYTE ABSOLUTE COUNT: 2.8 % (ref 0.5–2)
RH FACTOR: NORMAL
SEG NEUTROPHILS: 65.6 %
SEGMENTED NEUTROPHILS ABSOLUTE COUNT: 6.5 THOU/MM3 (ref 1.8–7.7)
SODIUM BLD-SCNC: 136 MEQ/L (ref 135–145)
SPECIFIC GRAVITY, URINE: 1.01 (ref 1–1.03)
TOTAL IRON BINDING CAPACITY: 290 UG/DL (ref 171–450)
TOTAL PROTEIN: 7 G/DL (ref 6.1–8)
TROPONIN T: < 0.01 NG/ML
UROBILINOGEN, URINE: 0.2 EU/DL (ref 0–1)
WBC # BLD: 9.9 THOU/MM3 (ref 4.8–10.8)

## 2022-10-03 PROCEDURE — 2580000003 HC RX 258: Performed by: EMERGENCY MEDICINE

## 2022-10-03 PROCEDURE — 86301 IMMUNOASSAY TUMOR CA 19-9: CPT

## 2022-10-03 PROCEDURE — 83540 ASSAY OF IRON: CPT

## 2022-10-03 PROCEDURE — 83010 ASSAY OF HAPTOGLOBIN QUANT: CPT

## 2022-10-03 PROCEDURE — 86900 BLOOD TYPING SEROLOGIC ABO: CPT

## 2022-10-03 PROCEDURE — 83550 IRON BINDING TEST: CPT

## 2022-10-03 PROCEDURE — 85046 RETICYTE/HGB CONCENTRATE: CPT

## 2022-10-03 PROCEDURE — 85730 THROMBOPLASTIN TIME PARTIAL: CPT

## 2022-10-03 PROCEDURE — 74177 CT ABD & PELVIS W/CONTRAST: CPT

## 2022-10-03 PROCEDURE — 82728 ASSAY OF FERRITIN: CPT

## 2022-10-03 PROCEDURE — 84702 CHORIONIC GONADOTROPIN TEST: CPT

## 2022-10-03 PROCEDURE — 1200000000 HC SEMI PRIVATE

## 2022-10-03 PROCEDURE — 99223 1ST HOSP IP/OBS HIGH 75: CPT | Performed by: INTERNAL MEDICINE

## 2022-10-03 PROCEDURE — 99285 EMERGENCY DEPT VISIT HI MDM: CPT

## 2022-10-03 PROCEDURE — 85025 COMPLETE CBC W/AUTO DIFF WBC: CPT

## 2022-10-03 PROCEDURE — 93005 ELECTROCARDIOGRAM TRACING: CPT | Performed by: EMERGENCY MEDICINE

## 2022-10-03 PROCEDURE — 83615 LACTATE (LD) (LDH) ENZYME: CPT

## 2022-10-03 PROCEDURE — 86901 BLOOD TYPING SEROLOGIC RH(D): CPT

## 2022-10-03 PROCEDURE — 6360000004 HC RX CONTRAST MEDICATION: Performed by: EMERGENCY MEDICINE

## 2022-10-03 PROCEDURE — 83690 ASSAY OF LIPASE: CPT

## 2022-10-03 PROCEDURE — 84484 ASSAY OF TROPONIN QUANT: CPT

## 2022-10-03 PROCEDURE — 86850 RBC ANTIBODY SCREEN: CPT

## 2022-10-03 PROCEDURE — 80053 COMPREHEN METABOLIC PANEL: CPT

## 2022-10-03 PROCEDURE — 6370000000 HC RX 637 (ALT 250 FOR IP): Performed by: EMERGENCY MEDICINE

## 2022-10-03 PROCEDURE — 82378 CARCINOEMBRYONIC ANTIGEN: CPT

## 2022-10-03 PROCEDURE — 85610 PROTHROMBIN TIME: CPT

## 2022-10-03 PROCEDURE — 2580000003 HC RX 258

## 2022-10-03 PROCEDURE — 36415 COLL VENOUS BLD VENIPUNCTURE: CPT

## 2022-10-03 PROCEDURE — 93010 ELECTROCARDIOGRAM REPORT: CPT | Performed by: INTERNAL MEDICINE

## 2022-10-03 PROCEDURE — 86304 IMMUNOASSAY TUMOR CA 125: CPT

## 2022-10-03 PROCEDURE — 81003 URINALYSIS AUTO W/O SCOPE: CPT

## 2022-10-03 RX ORDER — ACETAMINOPHEN 325 MG/1
650 TABLET ORAL EVERY 6 HOURS PRN
Status: DISCONTINUED | OUTPATIENT
Start: 2022-10-03 | End: 2022-10-05 | Stop reason: HOSPADM

## 2022-10-03 RX ORDER — SODIUM CHLORIDE 9 MG/ML
1000 INJECTION, SOLUTION INTRAVENOUS CONTINUOUS
Status: DISCONTINUED | OUTPATIENT
Start: 2022-10-03 | End: 2022-10-04

## 2022-10-03 RX ORDER — METFORMIN HYDROCHLORIDE 500 MG/1
500 TABLET, EXTENDED RELEASE ORAL 2 TIMES DAILY
Status: DISCONTINUED | OUTPATIENT
Start: 2022-10-03 | End: 2022-10-04

## 2022-10-03 RX ORDER — POLYETHYLENE GLYCOL 3350 17 G/17G
17 POWDER, FOR SOLUTION ORAL DAILY PRN
Status: DISCONTINUED | OUTPATIENT
Start: 2022-10-03 | End: 2022-10-05 | Stop reason: HOSPADM

## 2022-10-03 RX ORDER — SODIUM CHLORIDE 0.9 % (FLUSH) 0.9 %
5-40 SYRINGE (ML) INJECTION EVERY 12 HOURS SCHEDULED
Status: DISCONTINUED | OUTPATIENT
Start: 2022-10-03 | End: 2022-10-05 | Stop reason: HOSPADM

## 2022-10-03 RX ORDER — ALBUTEROL SULFATE 90 UG/1
2 AEROSOL, METERED RESPIRATORY (INHALATION) EVERY 6 HOURS PRN
Status: DISCONTINUED | OUTPATIENT
Start: 2022-10-03 | End: 2022-10-04

## 2022-10-03 RX ORDER — PRAVASTATIN SODIUM 80 MG/1
80 TABLET ORAL DAILY
Status: DISCONTINUED | OUTPATIENT
Start: 2022-10-03 | End: 2022-10-03 | Stop reason: ALTCHOICE

## 2022-10-03 RX ORDER — ONDANSETRON 2 MG/ML
4 INJECTION INTRAMUSCULAR; INTRAVENOUS EVERY 6 HOURS PRN
Status: DISCONTINUED | OUTPATIENT
Start: 2022-10-03 | End: 2022-10-05 | Stop reason: HOSPADM

## 2022-10-03 RX ORDER — DOCUSATE SODIUM 100 MG/1
100 CAPSULE, LIQUID FILLED ORAL DAILY PRN
Status: DISCONTINUED | OUTPATIENT
Start: 2022-10-03 | End: 2022-10-05 | Stop reason: HOSPADM

## 2022-10-03 RX ORDER — ROSUVASTATIN CALCIUM 20 MG/1
40 TABLET, COATED ORAL EVERY EVENING
Status: DISCONTINUED | OUTPATIENT
Start: 2022-10-03 | End: 2022-10-05 | Stop reason: HOSPADM

## 2022-10-03 RX ORDER — HYDROCHLOROTHIAZIDE 25 MG/1
25 TABLET ORAL DAILY
Status: DISCONTINUED | OUTPATIENT
Start: 2022-10-04 | End: 2022-10-05 | Stop reason: HOSPADM

## 2022-10-03 RX ORDER — SODIUM CHLORIDE 0.9 % (FLUSH) 0.9 %
5-40 SYRINGE (ML) INJECTION PRN
Status: DISCONTINUED | OUTPATIENT
Start: 2022-10-03 | End: 2022-10-05 | Stop reason: HOSPADM

## 2022-10-03 RX ORDER — NITROGLYCERIN 0.4 MG/1
0.4 TABLET SUBLINGUAL EVERY 5 MIN PRN
Status: DISCONTINUED | OUTPATIENT
Start: 2022-10-03 | End: 2022-10-05 | Stop reason: HOSPADM

## 2022-10-03 RX ORDER — DICYCLOMINE HYDROCHLORIDE 10 MG/1
10 CAPSULE ORAL ONCE
Status: COMPLETED | OUTPATIENT
Start: 2022-10-03 | End: 2022-10-03

## 2022-10-03 RX ORDER — FERROUS SULFATE 325(65) MG
325 TABLET ORAL EVERY OTHER DAY
Status: DISCONTINUED | OUTPATIENT
Start: 2022-10-03 | End: 2022-10-03

## 2022-10-03 RX ORDER — LOSARTAN POTASSIUM 25 MG/1
25 TABLET ORAL DAILY
Status: DISCONTINUED | OUTPATIENT
Start: 2022-10-04 | End: 2022-10-05 | Stop reason: HOSPADM

## 2022-10-03 RX ORDER — SODIUM CHLORIDE 9 MG/ML
INJECTION, SOLUTION INTRAVENOUS PRN
Status: DISCONTINUED | OUTPATIENT
Start: 2022-10-03 | End: 2022-10-05 | Stop reason: HOSPADM

## 2022-10-03 RX ORDER — ASPIRIN 81 MG/1
81 TABLET, CHEWABLE ORAL DAILY
Status: DISCONTINUED | OUTPATIENT
Start: 2022-10-03 | End: 2022-10-05 | Stop reason: HOSPADM

## 2022-10-03 RX ORDER — ONDANSETRON 4 MG/1
4 TABLET, ORALLY DISINTEGRATING ORAL EVERY 8 HOURS PRN
Status: DISCONTINUED | OUTPATIENT
Start: 2022-10-03 | End: 2022-10-05 | Stop reason: HOSPADM

## 2022-10-03 RX ORDER — EZETIMIBE 10 MG/1
10 TABLET ORAL DAILY
Status: DISCONTINUED | OUTPATIENT
Start: 2022-10-04 | End: 2022-10-05 | Stop reason: HOSPADM

## 2022-10-03 RX ADMIN — Medication 25 MG: at 23:51

## 2022-10-03 RX ADMIN — IOPAMIDOL 80 ML: 755 INJECTION, SOLUTION INTRAVENOUS at 15:54

## 2022-10-03 RX ADMIN — ROSUVASTATIN CALCIUM 40 MG: 20 TABLET, COATED ORAL at 23:52

## 2022-10-03 RX ADMIN — SODIUM CHLORIDE 1000 ML: 9 INJECTION, SOLUTION INTRAVENOUS at 14:49

## 2022-10-03 RX ADMIN — SODIUM CHLORIDE, PRESERVATIVE FREE 10 ML: 5 INJECTION INTRAVENOUS at 23:52

## 2022-10-03 RX ADMIN — DICYCLOMINE HYDROCHLORIDE 10 MG: 10 CAPSULE ORAL at 14:49

## 2022-10-03 ASSESSMENT — ENCOUNTER SYMPTOMS
DIARRHEA: 0
BLOOD IN STOOL: 0
VOMITING: 0
NAUSEA: 0
RHINORRHEA: 0
WHEEZING: 0
COUGH: 0
CONSTIPATION: 1
ABDOMINAL PAIN: 1
SORE THROAT: 0

## 2022-10-03 ASSESSMENT — LIFESTYLE VARIABLES: HOW OFTEN DO YOU HAVE A DRINK CONTAINING ALCOHOL: MONTHLY OR LESS

## 2022-10-03 NOTE — ED TRIAGE NOTES
Pt presents to the ED via triage with c/o constipation and abdominal bloating. Pt states she had been constipated since Thursday and took mag citrate yesterday to finally have a bowel movement. Pt states stool was loose. Pt states she has difficulties going to the bathroom which is why she takes a laxative every other day. Pt states she has had abdominal bloating which has caused pain to be a 7 out of 10. Pt denies taking any medication for pain today. Pt denies vomiting.  RR Easy and unlabored

## 2022-10-03 NOTE — LETTER
[address]  [facility] [address]  Phone: [phone]    No name on file. October 5, 2022     Patient: Sheryl Jones   YOB: 1975   Date of Visit: 10/3/2022       To Whom It May Concern: It is my medical opinion that Herb Alfredo {Work release (duty restriction):34661}. If you have any questions or concerns, please don't hesitate to call. Sincerely,        No name on file.

## 2022-10-03 NOTE — LETTER
[address]  [facility] [address]  Phone: [phone]    No name on file. October 5, 2022     Patient: Loki Cody   YOB: 1975   Date of Visit: 10/3/2022       To Whom It May Concern: It is my medical opinion that Elissa Kingsley may return to full duty immediately with no restrictions. If you have any questions or concerns, please don't hesitate to call.     Sincerely,    Karthik Licona, DO Janelle Jesus RN

## 2022-10-03 NOTE — H&P
Internal Medicine Resident History and Physical          Patient: Bridget Tidwell  : 1975  MRN: [id number]     Acct: [de-identified]    PCP: BHUPINDER Benitez - CNP  Date of Admission: 10/3/2022  Date of Service: Pt seen/examined on 10/03/22  and Admitted to Observation with expected LOS less than two midnights due to medical therapy. Assessment and Plan:    #Abdominal Distentionlikely related to uterine mass as seen on imaging with ascites(likely malignant)- however will need biopsy  -Started around 2 weeks ago, and progressively worsened. CT scan of the abdomen without contrast demonstrates a large heterogeneous enhancing mass with cystic components arising from the uterus, and extending into the abdomen measuring up to 24 cm. Mass was present on prior MRI. There is also the presence of small to moderate ascites, which is concerning for uterine cancer. History of constipation. History of anemia. Baseline hemoglobin is between 12-14 from 2022.  -Plan is to do FBOT, iron levels, TIBC, ferritin levels, LDH, haptoglobin, blood smear, , CEA, CA 19-9, CMP, and CBC. -Consult sent to gynecology. Awaiting gynecology consults. #acute on chronic normocytic Anemia - likely related to th emass above, has hx of fe def  Baseline is 12-14 from 2022.   ? Etiology Uterine cancer Vs retroperitoneal bleeding. Denies melana, hematochezia. Hgb 7.4 on 10/3/2022. CT scan demonstrates uterine mass with mild to moderate ascites. -Hx of anemia on ferrous sulfate.  -check fe levels. fobt  -Transfuse if hgb is ,7.0g/dl and hemodynamically unstable   -monitor vitals and H and H 6 hourly       #CAD   s/p stent placement: Hx of coronary angioplasty with stent in 2016. She sees Dr. Bindu Landon. She is on aspirin at this time. She is also on pravastatin, rosuvastatin, ezetimibe.   -hold aspirin as she is anemic with hgb 7.4 on 10/3/2022.  -continue other oral hyperlipidemic drugs      #Hx of essential HTN   Controlled on hydrochlorothiazide, losartan, metoprolol. Continue present medications. #Hx of Hyperlipidemia   controlled on pravastatin, rosuvastatin. Continue home medications. #Hx of Hypothyroidism.   -Controlled on synthroid. Hx of Bilateral pedal edema.   -Plan for TSH with reflex T4. #Hx o f Diabetes Mellitus type 2- on oral hypoglycemic- not on insulin   -Controlled on metformin 500 mg 2 times. #Hx of COPD?  -She is not seeing any pulmonologist at this time  -o/e B/L wheezing in both lungs.  -smoking Hx of 1 ppd.   -She was diagnosed 5 years ago  -Currently on albuterol HFA 2 puff 6 hourly   -Plan for doueneb if wheezing worsens     #Constipation:   Possibly due to #1 above vs organic vs hypothyroidism. She has been using Dulcolax every other day at home. this seems to help. Continue Dulcolax daily. -TSH levels in the am.                   =======================================================================      Chief Complaint:  abdominal Distention of 2 weeks duration     History Of Present Illness:  Mandi Arellano is a 51 y.o. female with PMHx of coronary artery disease status post stents placement, hypothyroidism, hyperlipidemia, COPD, hypertension, hiatal hernia s/p surgical repair, who presents to [address] [place] [address] with abdominal distention of 2 weeks duration. Abdominal distention started 2 weeks ago when she noticed her abdomen started swelling up. The swelling of the abdomen is progressive. There is no associated or relieving symptoms of the abdominal swelling. She thought she was constipated initially, and took Dulcolax which seem to help. She denied easy satiety. She admits to generalized pain. Pain is sharp and all over the abdomen. The pain does not move outside the abdomen. She also states she had associated bilateral pedal edema a week ago. The bilateral leg swelling went away after a couple of days. She denied nausea on vomiting.   She denied chest pain.  She denied blood in the stools or urine. She denied hematemesis. She admits to cough. Her last menstrual period started on the 9/27/2022. Her period is ongoing. Her regular cycle usually lasts for 5 days duration. She denies clots in her menses or unusually heavy flow. She is a smoker and smokes 1ppd. ED course: In the emergency room, she had a CT scan of the abdomen done without contrast.  The CT scan demonstrated a large heterogeneous enhancing mass with cystic components arising from the uterus and extending into the abdomen measuring up to 24 cm. There is also small to moderate ascites. Past Medical History:        Diagnosis Date    Asthma     Backache     COPD (chronic obstructive pulmonary disease) ([facility]     José Miguel Severino    Coronary artery disease     Dr. Jovanny Lanza of blood and blood forming organ     anemia    Environmental allergies     Hyperlipidemia     Hypertension     Hypothyroidism     Type 2 diabetes mellitus ([facility]        Past Surgical History:        Procedure Laterality Date    COLONOSCOPY      CORONARY ANGIOPLASTY WITH STENT PLACEMENT  2016    [facility]    HERNIA REPAIR N/A 5/27/7469    Robotic Umbilical Hernia Repair with Mesh performed by Blanche Pineda MD at [address] [place] [address]  2014    teeth removed    MS COLONOSCOPY FLX DX W/COLLJ SPEC WHEN PFRMD N/A 06/20/2018    COLONOSCOPY performed by Jake Villatoro MD at [facility]    MS EGD TRANSORAL BIOPSY SINGLE/MULTIPLE N/A 06/20/2018    EGD performed by Jake Villatoro MD at [address] [place] [address]         Medications Prior to Admission:   Prior to Admission medications    Medication Sig Start Date End Date Taking?  Authorizing Provider   rosuvastatin (CRESTOR) 40 MG tablet Take 40 mg by mouth every evening    Historical Provider, MD   Multiple Vitamin (MULTIVITAMIN ADULT PO) Take by mouth    Historical Provider, MD   Fe Bisgly-Succ-C-Thre-B12-FA (IRON-150 PO) Take by mouth    Historical Provider, MD   Loratadine (CLARITIN PO) Take by mouth    Historical Provider, MD   ezetimibe (ZETIA) 10 MG tablet Take 10 mg by mouth daily    Historical Provider, MD   levothyroxine (SYNTHROID) 175 MCG tablet Take 175 mcg by mouth Daily    Historical Provider, MD   apixaban (ELIQUIS) 5 MG TABS tablet Take 1 tablet by mouth 2 times daily 8/25/21   Vaibhav Ozuna MD   metFORMIN (GLUCOPHAGE-XR) 500 MG extended release tablet Take 1 tablet by mouth 2 times daily 7/14/21   Historical Provider, MD   latanoprost (XALATAN) 0.005 % ophthalmic solution Apply 1 drop to eye nightly 7/14/21   Historical Provider, MD   docusate sodium (COLACE) 100 MG capsule Take 100 mg by mouth daily as needed for Constipation    Historical Provider, MD   ferrous sulfate (IRON 325) 325 (65 Fe) MG tablet Take 325 mg by mouth every other day     Historical Provider, MD   albuterol sulfate  (90 BASE) MCG/ACT inhaler Inhale 2 puffs into the lungs every 6 hours as needed for Wheezing     Historical Provider, MD   aspirin 81 MG chewable tablet Take 1 tablet by mouth daily 3/8/16   Vaibhav Ozuna MD   nitroGLYCERIN (NITROSTAT) 0.4 MG SL tablet Place 1 tablet under the tongue every 5 minutes as needed for Chest pain 3/8/16   Vaibhav Ozuna MD   losartan (COZAAR) 25 MG tablet Take 1 tablet by mouth daily 3/8/16   Vaibhav Ozuna MD   metoprolol (LOPRESSOR) 25 MG tablet Take 1 tablet by mouth 2 times daily 3/8/16   Vaibhav Ozuna MD   hydrochlorothiazide (HYDRODIURIL) 25 MG tablet Take 25 mg by mouth daily    Historical Provider, MD   acetaminophen (TYLENOL) 325 MG tablet Take 650 mg by mouth every 6 hours as needed. Historical Provider, MD   pravastatin (PRAVACHOL) 40 MG tablet Take 80 mg by mouth daily     Historical Provider, MD   therapeutic multivitamin-minerals (THERAGRAN-M) tablet Take 1 tablet by mouth daily.       Historical Provider, MD       Allergies:  Vicodin [hydrocodone-acetaminophen]    Social History:    The patient currently lives at home with her . Tobacco use:   reports that she has been smoking cigarettes. She has been smoking an average of .5 packs per day. She has never used smokeless tobacco.  Alcohol use:   reports current alcohol use. Drug use:  reports no history of drug use. Family History: Is as follows:      Problem Relation Age of Onset    Thyroid Disease Mother     Heart Disease Mother     Cancer Father     Diabetes Father     High Blood Pressure Father     Asthma Brother        Review of Systems:   Pertinent positives and negatives as noted in the HPI. Otherwise complete ROS negative. Physical Exam:    BP (!) 145/50   Pulse 87   Temp 98.2 °F (36.8 °C) (Oral)   Resp 18   Ht 5' 6\" (1.676 m)   Wt 230 lb (104.3 kg)   SpO2 99%   BMI 37.12 kg/m²       General appearance: No apparent distress, appears stated age. Eyes:  Pupils equal, round, and reactive to light. Conjunctivae/corneas clear. HENT: Head normal in appearance. External nares normal.  Oral mucosa moist without lesions. Hearing grossly intact. Neck: Supple, with full range of motion. Trachea midline. No gross JVD appreciated. Respiratory: She has diffuse bilateral wheezes. There is reduced respiratory efforts bilaterally. There is no rales or rhonchi. Cardiovascular: Normal rate, regular rhythm with normal S1/S2 without murmurs. 1+ none tender pitting edema in the lower extremities bilaterally. .   Abdomen: irregularly, hard in places. Generalized tenderness. Reduced bowel sounds in all four quadrants. Musculoskeletal: No joint swelling or tenderness. Normal tone. No abnormal movements. Skin: Warm and dry. No rashes or lesions. Neurologic:  No focal sensory/motor deficits in the upper and lower extremities. Cranial nerves:  grossly non-focal 2-12. Psychiatric: Alert and oriented, normal insight and thought content. Capillary Refill: Brisk,< 3 seconds. Peripheral Pulses: +2 palpable, equal bilaterally. Labs:     Recent Labs     10/03/22  1437   WBC 9.9   HGB 7.4*   HCT 24.0*   *     Recent Labs     10/03/22  1437      K 3.9   CL 96*   CO2 28   BUN 12   CREATININE 0.5   CALCIUM 9.5     Recent Labs     10/03/22  1437   AST 12   ALT 10*   BILITOT 0.3   ALKPHOS 68     Recent Labs     10/03/22  1721   INR 1.24*     No results for input(s): Jania Natarajan in the last 72 hours. Lab Results   Component Value Date/Time    NITRU NEGATIVE 10/03/2022 03:35 PM    BLOODU NEGATIVE 10/03/2022 03:35 PM    GLUCOSEU NEGATIVE 10/03/2022 03:35 PM         Radiology:     CT ABDOMEN PELVIS W IV CONTRAST Additional Contrast? None   Final Result      1. Large heterogeneous enhancing mass with cystic components arising from the uterus and extending into the abdomen measuring up to 24 cm. This was present on prior MRI but has marked increased in size in the interval.   2. Small to moderate ascites. **This report has been created using voice recognition software. It may contain minor errors which are inherent in voice recognition technology. **      Final report electronically signed by Dr. Rosaura Santacruz MD on 10/3/2022 4:21 PM             EKG:  I have reviewed the EKG with the following interpretation: Normal sinus EKG      PT/OT Eval Status:  will be assessed  Diet: No diet orders on file  DVT prophylaxis: None  Code Status: Prior  Disposition: admit to medical floor    Thank you BHUPINDER Lyles CNP for the opportunity to be involved in this patient's care. Electronically signed by Jordy Coronado DO on 10/3/2022 at 6:37 PM.     Case discussed with Attending, Dr. Nimco Haynes.

## 2022-10-03 NOTE — ED PROVIDER NOTES
[facility] COMPLAINT       Chief Complaint   Patient presents with    Constipation    Bloated       Nurses Notes reviewed and I agree except as noted in the HPI. HISTORY OF PRESENT ILLNESS    Kecia Ferris is a 51 y.o. pleasant female who presents to the emergency department for abdominal bloating. Patient reports that her abdomen has been bloated for a week and she has also had pain for that period of time. She tried to take milk of magnesia to have a bowel movement, did have a bowel movement yesterday that was runny. She reports that her abdomen is causing her pain diffusely and also feels tight to her. She states she has noticed shortness of breath and fatigue recently while at work. Denies fever, nausea, vomiting, black or bloody stools. No dysuria or hematuria. She reports her appetite has been good. She also took a second liquid laxative on Saturday to try to have a bowel movement. She typically always has to take a stool softener every other day due to chronic constipation. She reports history of abdominal surgery including a hernia repair at the end of April. No other previous abdominal surgeries. Her pain is decreased when she lays on her side and worse when she does a lot of walking. No other initial complaints or concerns. REVIEW OF SYSTEMS     Review of Systems   Constitutional:  Negative for diaphoresis and fever. HENT:  Negative for congestion, rhinorrhea and sore throat. Eyes:  Negative for visual disturbance. Respiratory:  Negative for cough and wheezing. Cardiovascular:  Negative for chest pain, palpitations and leg swelling. Gastrointestinal:  Positive for abdominal pain and constipation. Negative for blood in stool, diarrhea, nausea and vomiting. Endocrine: Negative for polyuria. Genitourinary:  Negative for difficulty urinating and dysuria. Musculoskeletal:  Negative for joint swelling. Skin:  Negative for rash. Neurological:  Negative for seizures, syncope, facial asymmetry, speech difficulty, weakness and headaches. Hematological:  Negative for adenopathy. Psychiatric/Behavioral:  Negative for confusion. All other systems reviewed and are negative. PAST MEDICAL HISTORY    has a past medical history of Asthma, Backache, COPD (chronic obstructive pulmonary disease) ([facility] Coronary artery disease, Disease of blood and blood forming organ, Environmental allergies, Hyperlipidemia, Hypertension, Hypothyroidism, and Type 2 diabetes mellitus ([facility] SURGICAL HISTORY      has a past surgical history that includes Mouth surgery (2014); Colonoscopy; Upper gastrointestinal endoscopy; pr colonoscopy flx dx w/collj spec when pfrmd (N/A, 06/20/2018); pr egd transoral biopsy single/multiple (N/A, 06/20/2018); Coronary angioplasty with stent (2016); and hernia repair (N/A, 4/28/2022).     CURRENT MEDICATIONS       Current Discharge Medication List        CONTINUE these medications which have NOT CHANGED    Details   Acetaminophen-Caff-Pyrilamine (MIDOL COMPLETE PO) Take by mouth      rosuvastatin (CRESTOR) 40 MG tablet Take 40 mg by mouth every evening      Multiple Vitamin (MULTIVITAMIN ADULT PO) Take by mouth      Fe Bisgly-Succ-C-Thre-B12-FA (IRON-150 PO) Take by mouth      Loratadine (CLARITIN PO) Take by mouth      ezetimibe (ZETIA) 10 MG tablet Take 10 mg by mouth daily      levothyroxine (SYNTHROID) 175 MCG tablet Take 175 mcg by mouth Daily      apixaban (ELIQUIS) 5 MG TABS tablet Take 1 tablet by mouth 2 times daily  Qty: 180 tablet, Refills: 1      metFORMIN (GLUCOPHAGE-XR) 500 MG extended release tablet Take 1 tablet by mouth 2 times daily      latanoprost (XALATAN) 0.005 % ophthalmic solution Apply 1 drop to eye nightly      docusate sodium (COLACE) 100 MG capsule Take 100 mg by mouth daily as needed for Constipation      ferrous sulfate (IRON 325) 325 (65 Fe) MG tablet Take 325 mg by mouth every other day       albuterol sulfate  (90 BASE) MCG/ACT inhaler Inhale 2 puffs into the lungs every 6 hours as needed for Wheezing       aspirin 81 MG chewable tablet Take 1 tablet by mouth daily  Qty: 30 tablet, Refills: 3      nitroGLYCERIN (NITROSTAT) 0.4 MG SL tablet Place 1 tablet under the tongue every 5 minutes as needed for Chest pain  Qty: 25 tablet, Refills: 3      losartan (COZAAR) 25 MG tablet Take 1 tablet by mouth daily  Qty: 30 tablet, Refills: 3      metoprolol (LOPRESSOR) 25 MG tablet Take 1 tablet by mouth 2 times daily  Qty: 60 tablet, Refills: 3      hydrochlorothiazide (HYDRODIURIL) 25 MG tablet Take 25 mg by mouth daily      acetaminophen (TYLENOL) 325 MG tablet Take 650 mg by mouth every 6 hours as needed. pravastatin (PRAVACHOL) 40 MG tablet Take 80 mg by mouth daily       therapeutic multivitamin-minerals (THERAGRAN-M) tablet Take 1 tablet by mouth daily. ALLERGIES     is allergic to vicodin [hydrocodone-acetaminophen]. FAMILY HISTORY     She indicated that her mother is . She indicated that her father is . She indicated that the status of her brother is unknown.   family history includes Asthma in her brother; Cancer in her father; Diabetes in her father; Heart Disease in her mother; High Blood Pressure in her father; Thyroid Disease in her mother. SOCIAL HISTORY      reports that she has been smoking cigarettes. She has been smoking an average of .5 packs per day. She has never used smokeless tobacco. She reports current alcohol use. She reports that she does not use drugs. PHYSICAL EXAM     INITIAL VITALS:  height is 5' 6\" (1.676 m) and weight is 230 lb (104.3 kg). Her oral temperature is 98.2 °F (36.8 °C). Her blood pressure is 145/50 (abnormal) and her pulse is 87. Her respiration is 18 and oxygen saturation is 99%.       CONSTITUTIONAL: [Awake, alert, non toxic, well developed, well nourished, no acute distress]  HEAD: [Normocephalic, atraumatic]  EYES: [Pupils equal, round & reactive to light, extraocular movements intact, no nystagmus, clear conjunctiva, non-icteric sclera]  ENT: [External ear canal clear without evidence of cerumen impaction or foreign body, TM's clear without erythema or bulging. Nares patent without drainage, septum appears midline. Moist mucus membranes, oropharynx clear without exudate, erythema, or mass. Uvula midline]  NECK: [Nontender and supple. No meningismus, no appreciated lymphadenopathy. Intact full range of motion. C-spine midline without vertebral tenderness. Trachea midline.]  CHEST: [Inspection normal, no lesions, equal rise. No crepitus or tenderness upon palpation.]  CARDIOVASCULAR: [Regular rate, rhythm, normal S1 and S2. No appreciated murmurs, rubs, or gallops. No pulse deficits appreciated. Intact distal perfusion. JVD not appreciated.]  PULMONARY: [Respiratory distress absent. Respiratory effort normal. Breath sounds clear to auscultation without rhonchi, rales, or wheezing. No accessory muscle use. No stridor]  ABDOMEN: [Inspection normal, without surgical scars. Soft, diffuse tenderness, +distended, with normoactive bowel sounds. +palpable firm area to right mid-abdomen. No rebound or guarding]  BACK: [Intact ROM. No midline vertebral tenderness, step off, or crepitus. No CVA tenderness.]  MUSCULOSKELETAL: [Extremities nontender to palpation. No gross deformity or evidence of external trauma. Intact range of motion. Sensation intact. No clubbing, cyanosis, or edema.]  SKIN: [Warm, dry. No jaundice, rash, urticaria, or petechiae]  NEUROLOGIC: [Alert and oriented x 3, GCS 15, normal mentation for age. Moves all four extremities. No gross sensory deficit.  Cerebellar function grossly normal.]  PSYCHIATRIC: [Normal mood and affect, thought process is clear and linear]     DIFFERENTIAL DIAGNOSIS:   ?mass with effect, ?ascites, ?SBO, others    DIAGNOSTIC RESULTS     EKG: All EKG's are interpreted by the Emergency Department Physician who either signs or Co-signsthis chart in the absence of a cardiologist.  EKG shows sinus rhythm at a rate of 82 bpm, DC interval 134 ms, QRS duration 80 ms,  ms, no ST elevation or depression, my interpretation. RADIOLOGY: non-plain film images(s) such as CT,Ultrasound and MRI are read by the radiologist.    CT ABDOMEN PELVIS W IV CONTRAST Additional Contrast? None   Final Result      1. Large heterogeneous enhancing mass with cystic components arising from the uterus and extending into the abdomen measuring up to 24 cm. This was present on prior MRI but has marked increased in size in the interval.   2. Small to moderate ascites. **This report has been created using voice recognition software. It may contain minor errors which are inherent in voice recognition technology. **      Final report electronically signed by Dr. Rosaura Santacruz MD on 10/3/2022 4:21 PM        [] Visualized and interpreted by me   [x] Radiologist's Wet Read Report Reviewed   [] Discussed withRadiologist.    LABS:   Labs Reviewed   CBC WITH AUTO DIFFERENTIAL - Abnormal; Notable for the following components:       Result Value    RBC 2.73 (*)     Hemoglobin 7.4 (*)     Hematocrit 24.0 (*)     MCHC 30.8 (*)     RDW-CV 15.7 (*)     RDW-SD 49.9 (*)     Platelets 563 (*)     Immature Grans (Abs) 0.12 (*)     All other components within normal limits   COMPREHENSIVE METABOLIC PANEL W/ REFLEX TO MG FOR LOW K - Abnormal; Notable for the following components:    Glucose 131 (*)     Chloride 96 (*)     Albumin 3.3 (*)     ALT 10 (*)     All other components within normal limits   OSMOLALITY - Abnormal; Notable for the following components:    Osmolality Calc 273.5 (*)     All other components within normal limits   PROTIME-INR - Abnormal; Notable for the following components:    INR 1.24 (*)     All other components within normal limits   RETICULOCYTES - Abnormal; Notable for the following components:    Retic Ct Abs 2.8 (*)     Immature Retic Fract 42.1 (*)     Retic Hemoglobin 20.6 (*)     All other components within normal limits   LIPASE   TROPONIN   URINALYSIS WITH REFLEX TO CULTURE   ANION GAP   GLOMERULAR FILTRATION RATE, ESTIMATED   APTT   HCG, QUANTITATIVE, PREGNANCY   CANCER ANTIGEN 19-9   BLOOD OCCULT STOOL SCREEN #1   HEMOGLOBIN AND HEMATOCRIT   HEMOGLOBIN AND HEMATOCRIT   IRON   LACTATE DEHYDROGENASE   IRON BINDING CAPACITY   FERRITIN   HAPTOGLOBIN   PERIPHERAL BLOOD SMEAR, PATH REVIEW   CBC   COMPREHENSIVE METABOLIC PANEL   CEA      TYPE AND SCREEN       EMERGENCY DEPARTMENT COURSE:   Vitals:    Vitals:    10/03/22 1357   BP: (!) 145/50   Pulse: 87   Resp: 18   Temp: 98.2 °F (36.8 °C)   TempSrc: Oral   SpO2: 99%   Weight: 230 lb (104.3 kg)   Height: 5' 6\" (1.676 m)       The results of pertinent diagnostic studies and exam findings were discussed. The patients provisional diagnosis and plan of care were discussed with the patient and present family. The patient and/or present family expressed understanding of the diagnosis and plan. T    CRITICAL CARE:   None    CONSULTS:  Hospitalist for admission    PROCEDURES:  None    FINAL IMPRESSION      1. Anemia, unspecified type    2. Uterine mass    3. Shortness of breath          DISPOSITION/PLAN   admit    PATIENT REFERRED TO:  No follow-up provider specified. DISCHARGE MEDICATIONS:  Current Discharge Medication List          (Please note that portions of this note were completed with a voice recognition program.  Efforts were made to edit the dictations but occasionally words are mis-transcribed.)    Provider:  I personally performed the services described in the documentation, reviewed and edited the documentation which was dictated, and it accurately records my words and actions.     Yeny Vincent MD 10/3/22 9:34 PM                 Yeny Vincent MD  10/03/22 0121

## 2022-10-03 NOTE — ED NOTES
ED to inpatient nurses report    Chief Complaint   Patient presents with    Constipation    Bloated      Present to ED from home  LOC: alert and orientated to name, place, date  Vital signs   Vitals:    10/03/22 1357   BP: (!) 145/50   Pulse: 87   Resp: 18   Temp: 98.2 °F (36.8 °C)   TempSrc: Oral   SpO2: 99%   Weight: 230 lb (104.3 kg)   Height: 5' 6\" (1.676 m)      Oxygen Baseline 99%    Current needs required RA   LDAs:   Peripheral IV 10/03/22 Left;Posterior Hand (Active)     Mobility: Independent  Pending ED orders: none  Present condition: stable  Preferred Language: [employer]     Electronically signed by Cody Miranda RN on 10/3/2022 at 5:29 PM       Cody Miranda RN  10/03/22 5948

## 2022-10-04 PROBLEM — D64.9 ANEMIA: Status: ACTIVE | Noted: 2022-10-04

## 2022-10-04 LAB
ALBUMIN SERPL-MCNC: 3.4 G/DL (ref 3.5–5.1)
ALP BLD-CCNC: 66 U/L (ref 38–126)
ALT SERPL-CCNC: 9 U/L (ref 11–66)
ANION GAP SERPL CALCULATED.3IONS-SCNC: 14 MEQ/L (ref 8–16)
AST SERPL-CCNC: 13 U/L (ref 5–40)
BILIRUB SERPL-MCNC: 0.3 MG/DL (ref 0.3–1.2)
BUN BLDV-MCNC: 11 MG/DL (ref 7–22)
CA 125: 215 U/ML
CA 19-9: 5 U/ML (ref 0–35)
CALCIUM SERPL-MCNC: 9.2 MG/DL (ref 8.5–10.5)
CHLORIDE BLD-SCNC: 97 MEQ/L (ref 98–111)
CO2: 26 MEQ/L (ref 23–33)
CREAT SERPL-MCNC: 0.5 MG/DL (ref 0.4–1.2)
ERYTHROCYTE [DISTWIDTH] IN BLOOD BY AUTOMATED COUNT: 15.8 % (ref 11.5–14.5)
ERYTHROCYTE [DISTWIDTH] IN BLOOD BY AUTOMATED COUNT: 53.5 FL (ref 35–45)
GFR SERPL CREATININE-BSD FRML MDRD: > 90 ML/MIN/1.73M2
GLUCOSE BLD-MCNC: 129 MG/DL (ref 70–108)
GLUCOSE BLD-MCNC: 143 MG/DL (ref 70–108)
GLUCOSE BLD-MCNC: 158 MG/DL (ref 70–108)
HCT VFR BLD CALC: 24.7 % (ref 37–47)
HCT VFR BLD CALC: 25.8 % (ref 37–47)
HCT VFR BLD CALC: 26.4 % (ref 37–47)
HCT VFR BLD CALC: 28.1 % (ref 37–47)
HEMOGLOBIN: 7.5 GM/DL (ref 12–16)
HEMOGLOBIN: 7.8 GM/DL (ref 12–16)
HEMOGLOBIN: 8 GM/DL (ref 12–16)
HEMOGLOBIN: 8.4 GM/DL (ref 12–16)
MCH RBC QN AUTO: 27.6 PG (ref 26–33)
MCHC RBC AUTO-ENTMCNC: 29.5 GM/DL (ref 32.2–35.5)
MCV RBC AUTO: 93.3 FL (ref 81–99)
PLATELET # BLD: 541 THOU/MM3 (ref 130–400)
PMV BLD AUTO: 10.9 FL (ref 9.4–12.4)
POTASSIUM SERPL-SCNC: 4.1 MEQ/L (ref 3.5–5.2)
RBC # BLD: 2.83 MILL/MM3 (ref 4.2–5.4)
REVIEWED BY: NORMAL
SMEAR REVIEW: NORMAL
SODIUM BLD-SCNC: 137 MEQ/L (ref 135–145)
TOTAL PROTEIN: 6.7 G/DL (ref 6.1–8)
WBC # BLD: 9.6 THOU/MM3 (ref 4.8–10.8)

## 2022-10-04 PROCEDURE — 99232 SBSQ HOSP IP/OBS MODERATE 35: CPT | Performed by: INTERNAL MEDICINE

## 2022-10-04 PROCEDURE — 85014 HEMATOCRIT: CPT

## 2022-10-04 PROCEDURE — 85018 HEMOGLOBIN: CPT

## 2022-10-04 PROCEDURE — 2580000003 HC RX 258

## 2022-10-04 PROCEDURE — 85027 COMPLETE CBC AUTOMATED: CPT

## 2022-10-04 PROCEDURE — 82948 REAGENT STRIP/BLOOD GLUCOSE: CPT

## 2022-10-04 PROCEDURE — 1200000000 HC SEMI PRIVATE

## 2022-10-04 PROCEDURE — 80053 COMPREHEN METABOLIC PANEL: CPT

## 2022-10-04 PROCEDURE — 36415 COLL VENOUS BLD VENIPUNCTURE: CPT

## 2022-10-04 RX ORDER — ALBUTEROL SULFATE 90 UG/1
2 AEROSOL, METERED RESPIRATORY (INHALATION) EVERY 4 HOURS PRN
Status: DISCONTINUED | OUTPATIENT
Start: 2022-10-04 | End: 2022-10-05 | Stop reason: HOSPADM

## 2022-10-04 RX ADMIN — ROSUVASTATIN CALCIUM 40 MG: 20 TABLET, COATED ORAL at 18:02

## 2022-10-04 RX ADMIN — EZETIMIBE 10 MG: 10 TABLET ORAL at 10:26

## 2022-10-04 RX ADMIN — SODIUM CHLORIDE, PRESERVATIVE FREE 10 ML: 5 INJECTION INTRAVENOUS at 10:30

## 2022-10-04 RX ADMIN — SODIUM CHLORIDE, PRESERVATIVE FREE 10 ML: 5 INJECTION INTRAVENOUS at 20:40

## 2022-10-04 ASSESSMENT — PAIN SCALES - GENERAL
PAINLEVEL_OUTOF10: 0

## 2022-10-04 NOTE — CARE COORDINATION
Case Management Assessment  Initial Evaluation    Date/Time of Evaluation: 10/4/2022 11:04 AM  Assessment Completed by: Soham Amato RN    If patient is discharged prior to next notation, then this note serves as note for discharge by case management. Patient Name: Luis A Gonzalez                   YOB: 1975  Diagnosis: Abdominal pain [R10.9]                   Date / Time: 10/3/2022  1:52 PM    Patient Admission Status: Inpatient     Current PCP: BHUPINDER An CNP  PCP verified by CM? Yes    Chart Reviewed: Yes      History Provided by: Patient  Patient Orientation: Alert and Oriented    Patient Cognition: Alert    Hospitalization in the last 30 days (Readmission):  No    If yes, Readmission Assessment in  Navigator will be completed. Advance Directives:     Code Status: Full Code       Discharge Planning  Patient lives with: Spouse/Significant Other Type of Home: Trailer/Mobile Home  Primary Care Giver: Self  Patient Support Systems include: Spouse/Significant Other   Current Financial resources:    Current community resources:    Current services prior to admission: None   Type of Home Care services:  None    ADLS  Prior functional level: Independent in ADLs/IADLs  Current functional level: Independent in ADLs/IADLs      Family can provide assistance at DC: Yes  Would you like Case Management to discuss the discharge plan with any other family members/significant others, and if so, who?     Plans to Return to Present Housing: Yes  Other Identified Issues/Barriers to RETURNING to current housing: yes  Potential Assistance needed at discharge: N/A  Patient expects to discharge to: Trailer/mobile home  Plan for transportation at discharge:      Financial  Payor: [employer] / Plan: [address] [place] [address] / Product Type: *No Product type* /     Does insurance require precert for SNF: Yes    Potential assistance Purchasing Medications:  no  Meds-to-Beds request: Yes      [facility] [address] [place], [place] - [address]  [facility]  Phone: [phone] Fax: [phone]      Factors facilitating achievement of predicted outcomes: Family support    Barriers to discharge: none  Procedures: 10/3 CT Abdomen Pelvis W IV Contrast 1. Large heterogeneous enhancing mass with cystic components arising from the uterus and extending into the abdomen measuring up to 24 cm. This was present on prior MRI but has marked increased in size in the interval.   2. Small to moderate ascites. Additional Case Management Notes: From ED. Hgb 7.8, General Surgery, Gyn/Onc consults, regular diet, pain and nausea control. Met with Alexander Bradshaw. She currently lives at home with her sig other. She is independent. Plan is to return home at discharge. She denies need for DME and declines HH. Will follow. The Plan for Transition of Care is related to the following treatment goals of Abdominal pain [R10.9]      The Patient and/or Patient Representative Agree with the Discharge Plan?       Aleja Gomes RN  Case Management Department

## 2022-10-04 NOTE — PLAN OF CARE
Problem: ABCDS Injury Assessment  Goal: Absence of physical injury  Outcome: Adequate for Discharge     Problem: Pain  Goal: Verbalizes/displays adequate comfort level or baseline comfort level  Outcome: Adequate for Discharge     Problem: Skin/Tissue Integrity - Adult  Goal: Skin integrity remains intact  Outcome: Adequate for Discharge     Problem: Gastrointestinal - Adult  Goal: Minimal or absence of nausea and vomiting  Outcome: Adequate for Discharge     Problem: Gastrointestinal - Adult  Goal: Maintains or returns to baseline bowel function  Outcome: Adequate for Discharge     Problem: Gastrointestinal - Adult  Goal: Maintains adequate nutritional intake  Outcome: Adequate for Discharge  Flowsheets (Taken 10/4/2022 0804)  Maintains adequate nutritional intake: Monitor percentage of each meal consumed     Problem: Metabolic/Fluid and Electrolytes - Adult  Goal: Electrolytes maintained within normal limits  Outcome: Adequate for Discharge  Flowsheets (Taken 10/4/2022 0804)  Electrolytes maintained within normal limits: Monitor labs and assess patient for signs and symptoms of electrolyte imbalances     Problem: Metabolic/Fluid and Electrolytes - Adult  Goal: Hemodynamic stability and optimal renal function maintained  Outcome: Adequate for Discharge     Problem: Metabolic/Fluid and Electrolytes - Adult  Goal: Glucose maintained within prescribed range  Outcome: Adequate for Discharge     Problem: Chronic Conditions and Co-morbidities  Goal: Patient's chronic conditions and co-morbidity symptoms are monitored and maintained or improved  Outcome: Adequate for Discharge

## 2022-10-04 NOTE — PROGRESS NOTES
Clinical Pharmacy Note  Metformin-IV Contrast Interaction Monitoring    Danii Acevedo is a 51 y.o. female. Patient has received IV contrast and is on metformin. Current Metformin dose: 500mg XR BID    Height:   Ht Readings from Last 1 Encounters:   10/03/22 5' 6\" (1.676 m)     Weight:  Wt Readings from Last 1 Encounters:   10/03/22 230 lb (104.3 kg)       Recent Labs     10/03/22  1437   CREATININE 0.5       Estimated Creatinine Clearance: 170 mL/min (based on SCr of 0.5 mg/dL). Assessment:  Contrast administered: Yes  Metformin discontinued: No - on hold    Date/time of contrast administration: 10/3 at 1554      Plan:  1. Metformin will be held for at least 48 hours and restarted if serum creatinine is within FDA approved guidelines. 2.  Pharmacy will check serum creatinine with morning labs on 10/6    Thank you.     Mckayla Good RP, BCPS, BCGP  10/3/2022     9:37 PM

## 2022-10-04 NOTE — PROGRESS NOTES
Internal Medicine Resident Progress Note    Patient:  Antonio Figueredo    YOB: 1975  Unit/Bed:5K-28/028-A  MRN: [id number]    Acct: [de-identified]   PCP: BHUPINDER Higginbotham CNP    Date of Admission: 10/3/2022      Assessment/Plan:     #uterine mass: as seen on imaging with ascites(highly suspicious for malignancy)- however will need biopsy to confirm:  -Started around 2 weeks ago, and progressively worsened. CT scan of the abdomen without contrast demonstrates a large heterogeneous enhancing mass with cystic components arising from the uterus, and extending into the abdomen measuring up to 24 cm. Mass was present on prior MRI. There is also the presence of small to moderate ascites, which is concerning for uterine cancer. History of constipation. History of anemia on ferrous sulphate supplements every other day. Baseline hemoglobin is between 12-14 from 2/21/2022.  -FBOT, iron levels 34, TIBC 290, ferritin levels 816, , -haptoglobin and  blood smear results pending,   - =215, CEA 1.0, CA 19-9 5,   -CMP, and CBC. -H and H trending up. Continue to monitor H&H daily.  -Consult sent to gynecology.    -Awaiting gynecology consults. #acute on chronic normocytic Anemia - likely related to the mass above, has hx of fe def anemia. Baseline is 12-14 from 2/21/2022.   ? Etiology Uterine cancer Vs retroperitoneal bleeding. Denies melana, hematochezia. Hgb 7.4 on 10/3/2022. CT scan demonstrates uterine mass with mild to moderate ascites. -Hx of anemia on ferrous sulfate.  -fe levels 34. Ferritin 816 indicating anemia of chronic disorders.   -Fobt results pending.   -Transfuse if hgb is ,7.0g/dl if hemodynamically unstable   -monitor vitals and H and H 6 hourly         #CAD   s/p stent placement: Hx of coronary angioplasty with stent in 2016. She sees Dr. Nancy Tony. She is on aspirin at this time. She is also on pravastatin, rosuvastatin, ezetimibe.   -hold aspirin as she is anemic with hgb 7.4 on 10/3/2022.  -continue other oral hyperlipidemic drugs        #Hx of essential HTN   Controlled on hydrochlorothiazide, losartan, metoprolol. Continue present medications. #Hx of Hyperlipidemia   controlled on pravastatin, rosuvastatin. Continue home medications. #Hx of Hypothyroidism.   -Controlled on synthroid. Hx of Bilateral pedal edema.   -Plan for TSH with reflex T4. #Hx o f Diabetes Mellitus type 2- on oral hypoglycemic- not on insulin.   -Controlled on metformin 500 mg 2 times. -Monitor POC glucose daily and trend. #Hx of COPD?  -She is not seeing any pulmonologist at this time  -o/e B/L wheezing in both lungs.  -smoking Hx of 1 ppd.   -She was diagnosed 5 years ago. No PFTs on record. -Currently on albuterol HFA 2 puff 6 hourly   -give doueneb if wheezing worsens. #Constipation:   Possibly due to #1 above vs organic vs hypothyroidism. She has been using Dulcolax every other day at home. this seems to help. Continue Dulcolax daily. -TSH levels in the am.             Expected discharge date:  TBA    Disposition:   [x] Home  [] TCU  [] Rehab  [] Psych  [] SNF  [] [facility]  [] Other-    ===================================================================      Chief Complaint: Abdominal swelling     Hospital Course: Antonio Figueredo is a 51 y.o. female with PMHx of coronary artery disease status post stents placement, hypothyroidism, hyperlipidemia, COPD, hypertension, hiatal hernia s/p surgical repair, who presents to [address] [place] [address] with abdominal distention of 2 weeks duration. Abdominal distention started 2 weeks ago when she noticed her abdomen started swelling up. The swelling of the abdomen is progressive. There is no associated or relieving symptoms of the abdominal swelling. She thought she was constipated initially, and took Dulcolax which seem to help. She denied easy satiety. She admits to generalized pain.   Pain is sharp and all over the abdomen. The pain does not move outside the abdomen. She also states she had associated bilateral pedal edema a week ago. The bilateral leg swelling went away after a couple of days. She denied nausea on vomiting. She denied chest pain. She denied blood in the stools or urine. She denied hematemesis. She admits to cough. Her last menstrual period started on the 9/27/2022. Her period is ongoing. Her regular cycle usually lasts for 5 days duration. She denies clots in her menses or unusually heavy flow. She is a smoker and smokes 1ppd. ED course: In the emergency room, she had a CT scan of the abdomen done without contrast.  The CT scan demonstrated a large heterogeneous enhancing mass with cystic components arising from the uterus and extending into the abdomen measuring up to 24 cm. There is also small to moderate ascites. Subjective (past 24 hours): She is still complains of generalized abdominal pain. She denies nausea, vomiting, headaches, dizziness, lightheadedness, diarrhea. ROS: reviewed complete ROS unchanged unless otherwise stated in hospital course/subjective portion.        Medications:  Reviewed    Infusion Medications    sodium chloride       Scheduled Medications    [Held by provider] apixaban  5 mg Oral BID    [Held by provider] aspirin  81 mg Oral Daily    ezetimibe  10 mg Oral Daily    hydroCHLOROthiazide  25 mg Oral Daily    levothyroxine  175 mcg Oral Daily    losartan  25 mg Oral Daily    metoprolol tartrate  25 mg Oral BID    rosuvastatin  40 mg Oral QPM    sodium chloride flush  5-40 mL IntraVENous 2 times per day     PRN Meds: albuterol sulfate HFA, docusate sodium, nitroGLYCERIN, sodium chloride flush, sodium chloride, ondansetron **OR** ondansetron, polyethylene glycol, acetaminophen **OR** acetaminophen      No intake or output data in the 24 hours ending 10/04/22 1452    Exam:  /60   Pulse 73   Temp 98.4 °F (36.9 °C) (Oral)   Resp 18   Ht 5' 6\" (1.676 m)   Wt 230 lb (104.3 kg)   SpO2 99%   BMI 37.12 kg/m²     General appearance: No apparent distress, appears stated age. Eyes:  Pupils equal, round, and reactive to light. Conjunctivae/corneas clear. HENT: Head normal in appearance. External nares normal.  Oral mucosa moist without lesions. Hearing grossly intact. Neck: Supple, with full range of motion. Trachea midline. No gross JVD appreciated. Respiratory: She has diffuse bilateral wheezes. There is reduced respiratory efforts bilaterally. There is no rales or rhonchi. Cardiovascular: Normal rate, regular rhythm with normal S1/S2 without murmurs. 1+ none tender pitting edema in the lower extremities bilaterally. .   Abdomen: irregularly, hard in places. Generalized tenderness. Reduced bowel sounds in all four quadrants. Musculoskeletal: No joint swelling or tenderness. Normal tone. No abnormal movements. Skin: Warm and dry. No rashes or lesions. Neurologic:  No focal sensory/motor deficits in the upper and lower extremities. Cranial nerves:  grossly non-focal 2-12. Psychiatric: Alert and oriented, normal insight and thought content. Capillary Refill: Brisk,< 3 seconds. Peripheral Pulses: +2 palpable, equal bilaterally. Labs:   Recent Labs     10/03/22  1437 10/04/22  0052 10/04/22  0401 10/04/22  1220   WBC 9.9  --  9.6  --    HGB 7.4* 7.5* 7.8* 8.0*   HCT 24.0* 24.7* 26.4* 25.8*   *  --  541*  --      Recent Labs     10/03/22  1437 10/04/22  0401    137   K 3.9 4.1   CL 96* 97*   CO2 28 26   BUN 12 11   CREATININE 0.5 0.5   CALCIUM 9.5 9.2     Recent Labs     10/03/22  1437 10/04/22  0401   AST 12 13   ALT 10* 9*   BILITOT 0.3 0.3   ALKPHOS 68 66     Recent Labs     10/03/22  1721   INR 1.24*     No results for input(s): Cleatrice Tirado in the last 72 hours. No results for input(s): PROCAL in the last 72 hours.    Lab Results   Component Value Date/Time    NITRU NEGATIVE 10/03/2022 03:35 PM    BLOODU NEGATIVE 10/03/2022 03:35 PM    GLUCOSEU NEGATIVE 10/03/2022 03:35 PM       Radiology (48 hours):  CT ABDOMEN PELVIS W IV CONTRAST Additional Contrast? None    Result Date: 10/3/2022  1. Large heterogeneous enhancing mass with cystic components arising from the uterus and extending into the abdomen measuring up to 24 cm. This was present on prior MRI but has marked increased in size in the interval. 2. Small to moderate ascites. **This report has been created using voice recognition software. It may contain minor errors which are inherent in voice recognition technology. ** Final report electronically signed by Dr. David England MD on 10/3/2022 4:21 PM       DVT prophylaxis:    [] Lovenox  [] SCDs  [] SQ Heparin  [x] Encourage ambulation   [] Already on Anticoagulation       Diet: ADULT DIET; Regular; 4 carb choices (60 gm/meal)  Code Status: Full Code  PT/OT: Not applicable  Tele: Not on telemetry monitoring. IVF: None.     Electronically signed by Saima Nguyen DO on 10/4/2022 at 2:52 PM    Case was discussed with Attending, Dr. Toshia Philip

## 2022-10-04 NOTE — RT PROTOCOL NOTE
RT Inhaler-Nebulizer Bronchodilator Protocol Note    There is a bronchodilator order in the chart from a provider indicating to follow the RT Bronchodilator Protocol and there is an Initiate RT Inhaler-Nebulizer Bronchodilator Protocol order as well (see protocol at bottom of note). CXR Findings:  No results found. The findings from the last RT Protocol Assessment were as follows:   History Pulmonary Disease: Smoker 15 pack years or more  Respiratory Pattern: Regular pattern and RR 12-20 bpm  Breath Sounds: Slightly diminished and/or crackles  Cough: Strong, spontaneous, non-productive  Indication for Bronchodilator Therapy: On home bronchodilators  Bronchodilator Assessment Score: 3    Aerosolized bronchodilator medication orders have been revised according to the RT Inhaler-Nebulizer Bronchodilator Protocol below. Respiratory Therapist to perform RT Therapy Protocol Assessment initially then follow the protocol. Repeat RT Therapy Protocol Assessment PRN for score 0-3 or on second treatment, BID, and PRN for scores above 3. No Indications - adjust the frequency to every 6 hours PRN wheezing or bronchospasm, if no treatments needed after 48 hours then discontinue using Per Protocol order mode. If indication present, adjust the RT bronchodilator orders based on the Bronchodilator Assessment Score as indicated below. Use Inhaler orders unless patient has one or more of the following: on home nebulizer, not able to hold breath for 10 seconds, is not alert and oriented, cannot activate and use MDI correctly, or respiratory rate 25 breaths per minute or more, then use the equivalent nebulizer order(s) with same Frequency and PRN reasons based on the score. If a patient is on this medication at home then do not decrease Frequency below that used at home.     0-3 - enter or revise RT bronchodilator order(s) to equivalent RT Bronchodilator order with Frequency of every 4 hours PRN for wheezing or increased work of breathing using Per Protocol order mode. 4-6 - enter or revise RT Bronchodilator order(s) to two equivalent RT bronchodilator orders with one order with BID Frequency and one order with Frequency of every 4 hours PRN wheezing or increased work of breathing using Per Protocol order mode. 7-10 - enter or revise RT Bronchodilator order(s) to two equivalent RT bronchodilator orders with one order with TID Frequency and one order with Frequency of every 4 hours PRN wheezing or increased work of breathing using Per Protocol order mode. 11-13 - enter or revise RT Bronchodilator order(s) to one equivalent RT bronchodilator order with QID Frequency and an Albuterol order with Frequency of every 4 hours PRN wheezing or increased work of breathing using Per Protocol order mode. Greater than 13 - enter or revise RT Bronchodilator order(s) to one equivalent RT bronchodilator order with every 4 hours Frequency and an Albuterol order with Frequency of every 2 hours PRN wheezing or increased work of breathing using Per Protocol order mode. RT to enter RT Home Evaluation for COPD & MDI Assessment order using Per Protocol order mode.     Electronically signed by Jagdeep Dalton RCP on 10/4/2022 at 4:54 PM

## 2022-10-05 ENCOUNTER — APPOINTMENT (OUTPATIENT)
Dept: ULTRASOUND IMAGING | Age: 47
DRG: 760 | End: 2022-10-05
Payer: COMMERCIAL

## 2022-10-05 VITALS
OXYGEN SATURATION: 98 % | BODY MASS INDEX: 36.96 KG/M2 | HEIGHT: 66 IN | HEART RATE: 86 BPM | DIASTOLIC BLOOD PRESSURE: 63 MMHG | TEMPERATURE: 97.8 F | WEIGHT: 230 LBS | RESPIRATION RATE: 18 BRPM | SYSTOLIC BLOOD PRESSURE: 111 MMHG

## 2022-10-05 LAB
ANION GAP SERPL CALCULATED.3IONS-SCNC: 10 MEQ/L (ref 8–16)
BUN BLDV-MCNC: 11 MG/DL (ref 7–22)
CALCIUM SERPL-MCNC: 9.2 MG/DL (ref 8.5–10.5)
CHLORIDE BLD-SCNC: 99 MEQ/L (ref 98–111)
CO2: 27 MEQ/L (ref 23–33)
CREAT SERPL-MCNC: 0.5 MG/DL (ref 0.4–1.2)
ERYTHROCYTE [DISTWIDTH] IN BLOOD BY AUTOMATED COUNT: 15.8 % (ref 11.5–14.5)
ERYTHROCYTE [DISTWIDTH] IN BLOOD BY AUTOMATED COUNT: 50.5 FL (ref 35–45)
GFR SERPL CREATININE-BSD FRML MDRD: > 90 ML/MIN/1.73M2
GLUCOSE BLD-MCNC: 143 MG/DL (ref 70–108)
GLUCOSE BLD-MCNC: 146 MG/DL (ref 70–108)
GLUCOSE BLD-MCNC: 147 MG/DL (ref 70–108)
HCT VFR BLD CALC: 27 % (ref 37–47)
HEMOGLOBIN: 8.2 GM/DL (ref 12–16)
MCH RBC QN AUTO: 27 PG (ref 26–33)
MCHC RBC AUTO-ENTMCNC: 30.4 GM/DL (ref 32.2–35.5)
MCV RBC AUTO: 88.8 FL (ref 81–99)
PLATELET # BLD: 580 THOU/MM3 (ref 130–400)
PMV BLD AUTO: 10.4 FL (ref 9.4–12.4)
POTASSIUM SERPL-SCNC: 4.7 MEQ/L (ref 3.5–5.2)
RBC # BLD: 3.04 MILL/MM3 (ref 4.2–5.4)
SODIUM BLD-SCNC: 136 MEQ/L (ref 135–145)
WBC # BLD: 9.6 THOU/MM3 (ref 4.8–10.8)

## 2022-10-05 PROCEDURE — 36415 COLL VENOUS BLD VENIPUNCTURE: CPT

## 2022-10-05 PROCEDURE — 2580000003 HC RX 258

## 2022-10-05 PROCEDURE — 85027 COMPLETE CBC AUTOMATED: CPT

## 2022-10-05 PROCEDURE — 76830 TRANSVAGINAL US NON-OB: CPT

## 2022-10-05 PROCEDURE — 82948 REAGENT STRIP/BLOOD GLUCOSE: CPT

## 2022-10-05 PROCEDURE — 76856 US EXAM PELVIC COMPLETE: CPT

## 2022-10-05 PROCEDURE — 99238 HOSP IP/OBS DSCHRG MGMT 30/<: CPT | Performed by: INTERNAL MEDICINE

## 2022-10-05 PROCEDURE — 80048 BASIC METABOLIC PNL TOTAL CA: CPT

## 2022-10-05 RX ADMIN — LOSARTAN POTASSIUM 25 MG: 25 TABLET ORAL at 09:51

## 2022-10-05 RX ADMIN — Medication 25 MG: at 09:52

## 2022-10-05 RX ADMIN — HYDROCHLOROTHIAZIDE 25 MG: 25 TABLET ORAL at 09:53

## 2022-10-05 RX ADMIN — EZETIMIBE 10 MG: 10 TABLET ORAL at 09:54

## 2022-10-05 RX ADMIN — SODIUM CHLORIDE, PRESERVATIVE FREE 10 ML: 5 INJECTION INTRAVENOUS at 09:54

## 2022-10-05 NOTE — DISCHARGE SUMMARY
Internal Medicine Resident Discharge Summary      Patient Identification:   Bart Acosta   : 1975  MRN: [id number]   Account: [de-identified]      Patient's PCP: BHUPINDER Marmolejo CNP    Admit Date: 10/3/2022     Discharge Date:   10/5/2022    Admitting Physician: Liset Payan DO     Discharge Physician: Sergio Bonilla DO       Hospital Course:   Bart Acosta is a 51 y.o. female with PMHx of coronary artery disease status post stents placement, hypothyroidism, hyperlipidemia, COPD, hypertension, hiatal hernia s/p surgical repair, admitted to [address] [place] [address] on 10/3/2022  with abdominal distention of 2 weeks duration. Abdominal distention started 2 weeks ago when she noticed her abdomen started swelling up. The swelling of the abdomen is progressive. There is no associated or relieving symptoms of the abdominal swelling. She thought she was constipated initially, and took Dulcolax which seem to help. She denied easy satiety. She admits to generalized pain. Pain is sharp and all over the abdomen. The pain does not move outside the abdomen. She also states she had associated bilateral pedal edema a week ago. The bilateral leg swelling went away after a couple of days. She denied nausea on vomiting. She denied chest pain. She denied blood in the stools or urine. She denied hematemesis. She admits to cough. Her last menstrual period started on the 2022. Her period is ongoing. Her regular cycle usually lasts for 5 days duration. She denies clots in her menses or unusually heavy flow. She is a smoker and smokes 1ppd. ED course: In the emergency room, she had a CT scan of the abdomen done without contrast.  The CT scan demonstrated a large heterogeneous enhancing mass with cystic components arising from the uterus and extending into the abdomen measuring up to 24 cm. There is also small to moderate ascites. 10/5/2022 he is doing okay.   She has no new The levofloxacin has / have been converted to Oral per [facility] IV-to-PO Auto-Conversion Protocol for Adults as approved by the Pharmacy and Therapeutics Committee  The patient met all eligible criteria:  3 Age = 25years old   2) Received at least one dose of the IV form   3) Receiving at least one other scheduled oral/enteral medication   4) Tolerating an oral/enteral diet   and did not have any exclusions:   1) Critical care patient   2) Active GI bleed (IF assessing H2RAs or PPIs)   3) Continuous tube feeding (IF assessing cipro, doxycycline, levofloxacin, minocycline, rifampin, or voriconazole)   4) Receiving PO vancomycin (IF assessing metronidazole)   5) Persistent nausea and/or vomiting   6) Ileus or gastrointestinal obstruction   7) Pauly/nasogastric tube set for continuous suction   8) Specific order not to automatically convert to PO (in the order's comments or if discussed in the most recent Infectious Disease or primary team's progress notes)  symptoms. She was seen by the OB/GYN Dr. Sydney Klinefelter. Discharge Diagnoses:    #uterine mass: as seen on imaging with ascites(highly suspicious for malignancy)- however will need biopsy to confirm:  -Started around 2 weeks ago, and progressively worsened. CT scan of the abdomen without contrast demonstrates a large heterogeneous enhancing mass with cystic components arising from the uterus, and extending into the abdomen measuring up to 24 cm. Mass was present on prior MRI. There is also the presence of small to moderate ascites, which is concerning for uterine cancer. History of constipation. History of anemia on ferrous sulphate supplements every other day. Baseline hemoglobin is between 12-14 from 2/21/2022.  -FBOT, iron levels 34, TIBC 290, ferritin levels 816, , -haptoglobin and  blood smear results pending,   - =215, CEA 1.0, CA 19-9 5,   -CMP, and CBC. -H and H trending up. Continue to monitor H&H daily.  -Consult sent to gynecology. -appreciate obgyn consults     Plan   US pelvis complete and US non OB transvaginal demonstrates a large heterogenous mass in the uterus, likely leiomyoma. And right ovarian cyst.   -Referal to Dr. Rc Baker in [facility] for further evaluation            #acute on chronic normocytic Anemia - likely related to the mass above, has hx of fe def anemia. Baseline is 12-14 from 2/21/2022.   ? Etiology Uterine cancer Vs retroperitoneal bleeding. Denies melana, hematochezia. Hgb 7.4 on 10/3/2022. CT scan demonstrates uterine mass with mild to moderate ascites. -Hx of anemia on ferrous sulfate.  -fe levels 34. Ferritin 816 indicating anemia of chronic disorders.   -Fobt results pending.   -Transfuse if hgb is ,7.0g/dl if hemodynamically unstable   -monitor vitals and H and H 6 hourly     Plan   -To schedule an appointment with PCP in one week. -CBC and BMP in one week. #CAD   s/p stent placement: Hx of coronary angioplasty with stent in 2016.  She sees Dr. Perfecto Hoff. She is on aspirin at this time. She is also on pravastatin, rosuvastatin, ezetimibe. -hold aspirin as she is anemic with hgb 7.4 on 10/3/2022.  -continue other oral hyperlipidemic drugs    Plan  -continue home medications         #Hx of essential HTN   Controlled on hydrochlorothiazide, losartan, metoprolol. Continue present medications. Plan  -continue home medication   -see PCP in one week      #Hx of Hyperlipidemia   controlled on pravastatin, rosuvastatin. Continue home medications. Plan  -continue home medication      #Hx of Hypothyroidism.   -Controlled on synthroid. Hx of Bilateral pedal edema.   -Plan for TSH with reflex T4. Plan  -continue home medications      #Hx o f Diabetes Mellitus type 2- on oral hypoglycemic- not on insulin.   -Controlled on metformin 500 mg 2 times. -Monitor POC glucose daily and trend. Plan  -Continue home medications   -see PCP for Hgb A1c checks     #Hx of COPD?  -She is not seeing any pulmonologist at this time  -o/e B/L wheezing in both lungs.  -smoking Hx of 1 ppd.   -She was diagnosed 5 years ago. No PFTs on record. -Currently on albuterol HFA 2 puff 6 hourly   -give doueneb if wheezing worsens. #Constipation:   Possibly due to #1 above vs organic vs hypothyroidism. She has been using Dulcolax every other day at home. this seems to help. Continue Dulcolax daily. -TSH levels in the am.      Plan  -Uterine mass can be the aetiology of mass  -Appointment with OB/GYN at [facility] for further evaluation. The patient was seen and examined on day of discharge and this discharge summary is in conjunction with any daily progress note from day of discharge. The patient is discharged in stable condition.   Stable    Exam:     Vitals:  Vitals:    10/05/22 0815 10/05/22 0945 10/05/22 1330 10/05/22 1331   BP: 128/68 (!) 141/64 111/63    Pulse: 75 81 86 86   Resp: 18  18    Temp: 97.9 °F (36.6 °C)  97.8 °F (36.6 °C)    TempSrc: Oral  Oral SpO2: 98%  98%    Weight:       Height:         Weight: Weight: 230 lb (104.3 kg)     24 hour intake/output:  Intake/Output Summary (Last 24 hours) at 10/5/2022 1355  Last data filed at 10/5/2022 1331  Gross per 24 hour   Intake 390 ml   Output 1 ml   Net 389 ml     General appearance: No apparent distress, appears stated age. Eyes:  Pupils equal, round, and reactive to light. Conjunctivae/corneas clear. HENT: Head normal in appearance. External nares normal.  Oral mucosa moist without lesions. Hearing grossly intact. Neck: Supple, with full range of motion. Trachea midline. No gross JVD appreciated. Respiratory: She has diffuse bilateral wheezes. There is reduced respiratory efforts bilaterally. There is no rales or rhonchi. Cardiovascular: Normal rate, regular rhythm with normal S1/S2 without murmurs. 1+ none tender pitting edema in the lower extremities bilaterally. .   Abdomen: irregularly, hard in places. Generalized tenderness. Reduced bowel sounds in all four quadrants. Musculoskeletal: No joint swelling or tenderness. Normal tone. No abnormal movements. Skin: Warm and dry. No rashes or lesions. Neurologic:  No focal sensory/motor deficits in the upper and lower extremities. Cranial nerves:  grossly non-focal 2-12. Psychiatric: Alert and oriented, normal insight and thought content. Capillary Refill: Brisk,< 3 seconds. Peripheral Pulses: +2 palpable, equal bilaterally. Labs:  For convenience and continuity at follow-up the following most recent labs are provided:    CBC:    Lab Results   Component Value Date/Time    WBC 9.6 10/05/2022 03:40 AM    HGB 8.2 10/05/2022 03:40 AM    HCT 27.0 10/05/2022 03:40 AM     10/05/2022 03:40 AM       Renal:    Lab Results   Component Value Date/Time     10/05/2022 03:40 AM    K 4.7 10/05/2022 03:40 AM    K 3.9 10/03/2022 02:37 PM    CL 99 10/05/2022 03:40 AM    CO2 27 10/05/2022 03:40 AM    BUN 11 10/05/2022 03:40 AM CREATININE 0.5 10/05/2022 03:40 AM    CALCIUM 9.2 10/05/2022 03:40 AM         Significant Diagnostic Studies    Radiology:   CT ABDOMEN PELVIS W IV CONTRAST Additional Contrast? None   Final Result      1. Large heterogeneous enhancing mass with cystic components arising from the uterus and extending into the abdomen measuring up to 24 cm. This was present on prior MRI but has marked increased in size in the interval.   2. Small to moderate ascites. **This report has been created using voice recognition software. It may contain minor errors which are inherent in voice recognition technology. **      Final report electronically signed by Dr. Jess Escobar MD on 10/3/2022 4:21 PM      US PELVIS COMPLETE    (Results Pending)   US NON OB TRANSVAGINAL    (Results Pending)          Consults:     IP CONSULT TO GYNECOLOGIC ONCOLOGY  IP CONSULT TO OB GYN    Disposition: Home  Condition at Discharge: Stable    Code Status:  Full Code Yes    Patient Instructions:    Discharge lab work: CBC, BMP. Activity: activity as tolerated  Diet: ADULT DIET;  Regular; 4 carb choices (60 gm/meal)      Follow-up visits:   Danilo Garvey MD  [address] [place] [address] [address]  [address] [place] [address]  [phone]    Schedule an appointment as soon as possible for a visit in 1 week(s)  Referring for uterine mass extending to abdomen    [facility], APRN - CNP  [facility]  [phone]    Call in 1 week(s)           Discharge Medications:        Medication List        CONTINUE taking these medications      acetaminophen 325 MG tablet  Commonly known as: TYLENOL     albuterol sulfate  (90 Base) MCG/ACT inhaler  Commonly known as: PROVENTIL;VENTOLIN;PROAIR     apixaban 5 MG Tabs tablet  Commonly known as: Eliquis  Take 1 tablet by mouth 2 times daily     aspirin 81 MG chewable tablet  Take 1 tablet by mouth daily     CLARITIN PO     docusate sodium 100 MG capsule  Commonly known as: COLACE ezetimibe 10 MG tablet  Commonly known as: ZETIA     hydroCHLOROthiazide 25 MG tablet  Commonly known as: HYDRODIURIL     IRON-150 PO     latanoprost 0.005 % ophthalmic solution  Commonly known as: XALATAN     levothyroxine 175 MCG tablet  Commonly known as: SYNTHROID     losartan 25 MG tablet  Commonly known as: COZAAR  Take 1 tablet by mouth daily     metFORMIN 500 MG extended release tablet  Commonly known as: GLUCOPHAGE-XR     metoprolol tartrate 25 MG tablet  Commonly known as: LOPRESSOR  Take 1 tablet by mouth 2 times daily     MIDOL COMPLETE PO     MULTIVITAMIN ADULT PO     nitroGLYCERIN 0.4 MG SL tablet  Commonly known as: NITROSTAT  Place 1 tablet under the tongue every 5 minutes as needed for Chest pain     pravastatin 40 MG tablet  Commonly known as: PRAVACHOL     rosuvastatin 40 MG tablet  Commonly known as: CRESTOR     therapeutic multivitamin-minerals tablet            STOP taking these medications      ferrous sulfate 325 (65 Fe) MG tablet  Commonly known as: IRON 325                   Time Spent on discharge is 35 minutes in the examination, evaluation, counseling and review of medications and discharge plan. [facility], APR[facility] - CNP for the opportunity to be involved in this patient's care.       Signed:    Electronically signed by Jane Cisneros DO on 10/5/22 at 1:55 PM EDT     Case was discussed with Attending, Dr. Deepika Gaming

## 2022-10-05 NOTE — PROGRESS NOTES
Patient provided with discharge instructions, AVS and lab orders. All questions answered. Provided patient with instructions to call [facility] if she doesn't have an appointment by Friday. Home medications sent with patient. Chart contents broken down and placed in yellow bin. Patient transported via wheelchair to [facility].

## 2022-10-05 NOTE — PROGRESS NOTES
Report given to Primary RN, Lisa Watson. Patient returned to bed from bathroom, visitor at bedside, call light in reach, bedside table in reach, patient denies any needs.  Tory QIU/RSC

## 2022-10-05 NOTE — CONSULTS
Department of Obstetrics and Gynecology   History & Physical    Pt Name: Ruslan Barron  MRN: [id number] [place] #: [de-identified]  YOB: 1975    HPI: The patient is a 51 y.o.  female  who presented to the ER c/o constipation, bloating. She had tried several medications to help with constipation but wasn't having regular bowel movements and was feeling uncomfortable. She is not currently in pain and is feeling fine. She gets gyn care/pap smears [address] and denies any abnormal pap smears. She has periods that are light in nature. Allergies:     Allergies as of 10/03/2022 - Fully Reviewed 10/03/2022   Allergen Reaction Noted    Vicodin [hydrocodone-acetaminophen] Anxiety 2016       Medications:    Current Facility-Administered Medications:     albuterol sulfate HFA (PROVENTIL;VENTOLIN;PROAIR) 108 (90 Base) MCG/ACT inhaler 2 puff, 2 puff, Inhalation, Q4H PRN, Wayne Coleman MD    [Held by provider] apixaban (ELIQUIS) tablet 5 mg, 5 mg, Oral, BID, Vipin Parsons, DO    [Held by provider] aspirin chewable tablet 81 mg, 81 mg, Oral, Daily, Vipin Parsons, DO    docusate sodium (COLACE) capsule 100 mg, 100 mg, Oral, Daily PRN, Vipin Crumhi, DO    ezetimibe (ZETIA) tablet 10 mg, 10 mg, Oral, Daily, Vipin Parsons, DO, 10 mg at 10/04/22 1026    hydroCHLOROthiazide (HYDRODIURIL) tablet 25 mg, 25 mg, Oral, Daily, Ogbeitan Ebuehi, DO    levothyroxine (SYNTHROID) tablet 175 mcg, 175 mcg, Oral, Daily, Vipin Parsons, DO    losartan (COZAAR) tablet 25 mg, 25 mg, Oral, Daily, Alda Worthy DO    metoprolol tartrate (LOPRESSOR) tablet 25 mg, 25 mg, Oral, BID, Alda Worthy, , 25 mg at 10/03/22 7431    nitroGLYCERIN (NITROSTAT) SL tablet 0.4 mg, 0.4 mg, SubLINGual, Q5 Min PRN, Vipin Parsons DO    rosuvastatin (CRESTOR) tablet 40 mg, 40 mg, Oral, QPM, Vipin Parsons DO, 40 mg at 10/04/22 180    sodium chloride flush 0.9 % injection 5-40 mL, 5-40 mL, IntraVENous, 2 times per day, [place] Ebuehi, DO, 10 mL at 10/04/22 2040    sodium chloride flush 0.9 % injection 5-40 mL, 5-40 mL, IntraVENous, PRN, Ogbeiye Ebuehi, DO    0.9 % sodium chloride infusion, , IntraVENous, PRN, Ogbeiye Ebuehi, DO    ondansetron (ZOFRAN-ODT) disintegrating tablet 4 mg, 4 mg, Oral, Q8H PRN **OR** ondansetron (ZOFRAN) injection 4 mg, 4 mg, IntraVENous, Q6H PRN, Ogbeiye Ebuehi, DO    polyethylene glycol (GLYCOLAX) packet 17 g, 17 g, Oral, Daily PRN, Ogbeiye Ebuehi, DO    acetaminophen (TYLENOL) tablet 650 mg, 650 mg, Oral, Q6H PRN **OR** acetaminophen (TYLENOL) suppository 650 mg, 650 mg, Rectal, Q6H PRN, Ogbeiye Ebuehi, DO    OB History:     Gyn History: Denies h/o abnormal pap smear, h/o STDs. Past Medical History:   Past Medical History:   Diagnosis Date    Asthma     Backache     [facility]     Ana Lacks    Coronary artery disease     Dr. Tio Lei of blood and blood forming organ     anemia    Environmental allergies     Hyperlipidemia     Hypertension     Hypothyroidism     Type 2 diabetes mellitus ([facility]        Past Surgical History:   Past Surgical History:   Procedure Laterality Date    COLONOSCOPY      CORONARY ANGIOPLASTY WITH STENT PLACEMENT      [facility]    HERNIA REPAIR N/A     Robotic Umbilical Hernia Repair with Mesh performed by Barney Estrada MD at [facility]      teeth removed    CT COLONOSCOPY FLX DX W/COLLJ SPEC WHEN PFRMD N/A 2018    COLONOSCOPY performed by Mavis Dueñas MD at [facility]    CT EGD TRANSORAL BIOPSY SINGLE/MULTIPLE N/A 2018    EGD performed by Mavis Dueñas MD at [facility]    UPPER GASTROINTESTINAL ENDOSCOPY         Social History:   Social History     Tobacco Use   Smoking Status Every Day    Packs/day: 0.50    Types: Cigarettes   Smokeless Tobacco Never        Family History: Noncontributory; Denies h/o cancer.      ROS:  Negative except as stated in HPI    PE:  Vitals: 10/05/22 0410   BP: 117/68   Pulse: 73   Resp: 18   Temp: 97.8 °F (36.6 °C)   SpO2: 97%       General: well nourished, well developed, in no acute distress  Resp: breathing unlabored  Abdomen: Nontender, no rebound, no guarding  Pelvic: deferred     Labs:   Lab Results   Component Value Date    WBC 9.6 10/05/2022    HGB 8.2 (L) 10/05/2022    HCT 27.0 (L) 10/05/2022    MCV 88.8 10/05/2022     (H) 10/05/2022     Lab Results   Component Value Date     215 (H) 10/03/2022     Imaging:     10/3/22 CT:   FINDINGS:    Visualized portions of the lungs are clear. Visualized portion of the heart is unremarkable. There is diffuse thinning of the abdominal rectus muscles with prominent diastases throughout. A portion of the liver protrudes beyond the rib margins associated with the diastases. There is small amount of fluid adjacent to the liver anteriorly. No    focal liver lesion is identified. There is a focal calcification within the gallbladder which is otherwise unremarkable. Adrenal glands and kidneys are unremarkable. The spleen is normal in appearance. There is small amount of fluid adjacent to the    spleen. The pancreas is partially fatty replaced. No retroperitoneal or mesenteric lymphadenopathy is identified. Large bowel is partially decompressed with scattered stool. Small bowel appears within normal limits. There is a large lobulated heterogeneous mass extending from the uterus with prominent irregular cystic components and enhancing soft tissue components. This measures approximately 24 x 21 x 16 cm in greatest oblique cc, ML and oblique AP dimensions, respectively. This was present on prior MRI but prominently increased in the interval. The mass extends into the abdomen and displaces bowel. The bladder    is partially distended. There is small amount of free fluid in the pelvis. There are mild to moderate degenerative changes of the lumbar spine most pronounced at L5-S1.  No suspicious osseous lesion is identified. Impression       1. Large heterogeneous enhancing mass with cystic components arising from the uterus and extending into the abdomen measuring up to 24 cm. This was present on prior MRI but has marked increased in size in the interval.   2. Small to moderate ascites. Assessment: 51 y.o.  female with pelvic mass, elevated     Plan:   Discussed CT and labs in detail. Discussed concern for gyn cancer. U/s would be helpful to evaluate ovaries and determine where mass is coming from. Recommended with or without u/s to f/u with gyn oncology for likely surgical intervention in the next 1-2 weeks. She would like to have the u/s prior to discharge. Once this is done she is cleared to be discharged from gyn stand point with close f/u with gyn onc at [facility]. Primary team states they are able to set that up. Will have her f/u with me in the next 1-2 wks. All questions answered.      Vadim James MD  8:09 AM  10/5/2022

## 2022-10-05 NOTE — CARE COORDINATION
10/5/22, 2:59 PM EDT    Patient goals/plan/ treatment preferences discussed by  and . Patient goals/plan/ treatment preferences reviewed with patient/ family. Patient/ family verbalize understanding of discharge plan and are in agreement with goal/plan/treatment preferences. Understanding was demonstrated using the teach back method. AVS provided by RN at time of discharge, which includes all necessary medical information pertaining to the patients current course of illness, treatment, post-discharge goals of care, and treatment preferences. Services At/After Discharge: None  PT to be discharged to home with sig other. She denies needs or services.

## 2022-10-05 NOTE — PROGRESS NOTES
Received report from [employer]. Patient resting in bed, call light in reach, visitor at bedside, denies any needs.  Candelario QIU/GRACIAC

## 2022-10-05 NOTE — CARE COORDINATION
Referral to Samina [facility], documents faxed to [phone]. Fax verification received.   Electronically signed by Christin Rodrigues RN on 10/5/22 at 2:52 PM EDT

## 2022-10-05 NOTE — CARE COORDINATION
10/5/22, 9:09 AM EDT    DISCHARGE ON [address] [facility] day: 2  Location: -28/028-A Reason for admit: Abdominal pain [R10.9]   Procedure:   10/3 CT Abdomen Pelvis W IV Contrast 1. Large heterogeneous enhancing mass with cystic components arising from the uterus and extending into the abdomen measuring up to 24 cm. This was present on prior MRI but has marked increased in size in the interval.   2. Small to moderate ascites. Barriers to Discharge: Hgb 8.2, General Surgery, Gyn/Onc consults, regular diet, pain and nausea control. PCP: BHUPINDER Crouch CNP  Readmission Risk Score: 11.1%  Patient Goals/Plan/Treatment Preferences: Plan is to return home with sig other. Denies needs. Will follow.

## 2022-10-06 ENCOUNTER — HOSPITAL ENCOUNTER (OUTPATIENT)
Age: 47
Setting detail: SPECIMEN
Discharge: HOME | End: 2022-10-06

## 2022-10-06 DIAGNOSIS — D50.8 OTHER IRON DEFICIENCY ANEMIA: ICD-10-CM

## 2022-10-06 DIAGNOSIS — D64.9 ANEMIA, UNSPECIFIED TYPE: ICD-10-CM

## 2022-10-06 LAB
ANION GAP SERPL CALCULATED.3IONS-SCNC: 13 MMOL/L (ref 9–17)
BUN BLDV-MCNC: 13 MG/DL (ref 6–20)
CALCIUM SERPL-MCNC: 9.9 MG/DL (ref 8.6–10.4)
CHLORIDE BLD-SCNC: 95 MMOL/L (ref 98–107)
CO2: 25 MMOL/L (ref 20–31)
CREAT SERPL-MCNC: 0.6 MG/DL (ref 0.5–0.9)
GFR SERPL CREATININE-BSD FRML MDRD: >60 ML/MIN/1.73M2
GLUCOSE BLD-MCNC: 100 MG/DL (ref 70–99)
HCT VFR BLD CALC: 30.4 % (ref 36.3–47.1)
HEMOGLOBIN: 8.7 G/DL (ref 11.9–15.1)
MCH RBC QN AUTO: 26.5 PG (ref 25.2–33.5)
MCHC RBC AUTO-ENTMCNC: 28.6 G/DL (ref 28.4–34.8)
MCV RBC AUTO: 92.7 FL (ref 82.6–102.9)
NRBC AUTOMATED: 1 PER 100 WBC
PDW BLD-RTO: 15.9 % (ref 11.8–14.4)
PLATELET # BLD: 625 K/UL (ref 138–453)
PMV BLD AUTO: 10.4 FL (ref 8.1–13.5)
POTASSIUM SERPL-SCNC: 5.3 MMOL/L (ref 3.7–5.3)
RBC # BLD: 3.28 M/UL (ref 3.95–5.11)
SODIUM BLD-SCNC: 133 MMOL/L (ref 135–144)
WBC # BLD: 10.1 K/UL (ref 3.5–11.3)

## 2022-10-07 LAB — HAPTOGLOBIN: 380 MG/DL (ref 30–200)

## 2022-10-11 RX ORDER — LOSARTAN POTASSIUM 25 MG/1
TABLET ORAL
Qty: 90 TABLET | Refills: 0 | Status: SHIPPED | OUTPATIENT
Start: 2022-10-11

## 2022-10-11 RX ORDER — APIXABAN 5 MG/1
TABLET, FILM COATED ORAL
Qty: 180 TABLET | Refills: 0 | Status: SHIPPED | OUTPATIENT
Start: 2022-10-11

## 2022-10-11 NOTE — TELEPHONE ENCOUNTER
ELIQUIS 5 MG TWICE DAILY    LOSARTAN 25 MG DAILY    METOPROLOL TARTRATE 25 MG TWICE DAILY    NEXT APPOINTMENT 11/17/2022    LABS  08/2022 AND 10/2022

## 2022-11-17 ENCOUNTER — OFFICE VISIT (OUTPATIENT)
Dept: CARDIOLOGY CLINIC | Age: 47
End: 2022-11-17
Payer: COMMERCIAL

## 2022-11-17 VITALS
BODY MASS INDEX: 37.7 KG/M2 | HEART RATE: 43 BPM | HEIGHT: 66 IN | WEIGHT: 234.6 LBS | SYSTOLIC BLOOD PRESSURE: 130 MMHG | DIASTOLIC BLOOD PRESSURE: 70 MMHG | RESPIRATION RATE: 22 BRPM

## 2022-11-17 DIAGNOSIS — I10 HYPERTENSION, UNSPECIFIED TYPE: Primary | ICD-10-CM

## 2022-11-17 PROCEDURE — 3078F DIAST BP <80 MM HG: CPT | Performed by: INTERNAL MEDICINE

## 2022-11-17 PROCEDURE — 3074F SYST BP LT 130 MM HG: CPT | Performed by: INTERNAL MEDICINE

## 2022-11-17 PROCEDURE — 99214 OFFICE O/P EST MOD 30 MIN: CPT | Performed by: INTERNAL MEDICINE

## 2022-11-17 PROCEDURE — 93000 ELECTROCARDIOGRAM COMPLETE: CPT | Performed by: INTERNAL MEDICINE

## 2022-11-17 RX ORDER — HYDROCHLOROTHIAZIDE 25 MG/1
25 TABLET ORAL DAILY
Qty: 90 TABLET | Refills: 3 | Status: SHIPPED | OUTPATIENT
Start: 2022-11-17

## 2022-11-17 RX ORDER — ROSUVASTATIN CALCIUM 40 MG/1
40 TABLET, COATED ORAL EVERY EVENING
Qty: 90 TABLET | Refills: 3 | Status: SHIPPED | OUTPATIENT
Start: 2022-11-17

## 2022-11-17 RX ORDER — AMLODIPINE BESYLATE 5 MG/1
5 TABLET ORAL DAILY
Qty: 90 TABLET | Refills: 3 | Status: SHIPPED | OUTPATIENT
Start: 2022-11-17

## 2022-11-17 RX ORDER — EZETIMIBE 10 MG/1
10 TABLET ORAL DAILY
Qty: 90 TABLET | Refills: 3 | Status: SHIPPED | OUTPATIENT
Start: 2022-11-17

## 2022-11-17 RX ORDER — NITROGLYCERIN 0.4 MG/1
0.4 TABLET SUBLINGUAL EVERY 5 MIN PRN
Qty: 25 TABLET | Refills: 3 | Status: SHIPPED | OUTPATIENT
Start: 2022-11-17

## 2022-11-17 RX ORDER — LOSARTAN POTASSIUM 25 MG/1
TABLET ORAL
Qty: 90 TABLET | Refills: 3 | Status: SHIPPED | OUTPATIENT
Start: 2022-11-17

## 2022-11-17 ASSESSMENT — ENCOUNTER SYMPTOMS
VOMITING: 0
CHOKING: 0
COLOR CHANGE: 0
NAUSEA: 0
STRIDOR: 0
WHEEZING: 0
ABDOMINAL DISTENTION: 0
BLOOD IN STOOL: 0
COUGH: 0
TROUBLE SWALLOWING: 0
CHEST TIGHTNESS: 0
VOICE CHANGE: 0
ANAL BLEEDING: 0
SHORTNESS OF BREATH: 1
APNEA: 0
ABDOMINAL PAIN: 0

## 2022-11-17 NOTE — PROGRESS NOTES
[address] [facility]  Dept: [address] [facility]  Loc: [phone]     11/17/2022       Madhavi Nolen is here today for   Chief Complaint   Patient presents with    Follow-up           Referring Physician:  No ref. provider found     Patient Active Problem List   Diagnosis    Hypothyroidism    Cellulitis of leg, right    Hyperlipidemia    Chest pain at rest    H/O class III angina pectoris    Abnormal nuclear stress test    Incarcerated umbilical hernia    Abdominal pain    Anemia       Review of Systems   Constitutional:  Negative for activity change, appetite change, fatigue, fever and unexpected weight change. HENT:  Negative for congestion, trouble swallowing and voice change. Eyes:  Negative for visual disturbance. Respiratory:  Positive for shortness of breath. Negative for apnea, cough, choking, chest tightness, wheezing and stridor. Cardiovascular:  Positive for chest pain. Negative for palpitations and leg swelling. Gastrointestinal:  Negative for abdominal distention, abdominal pain, anal bleeding, blood in stool, nausea and vomiting. Endocrine: Negative for cold intolerance and heat intolerance. Genitourinary:  Negative for hematuria. Musculoskeletal:  Negative for arthralgias, gait problem, joint swelling and myalgias. Skin:  Negative for color change and rash. Allergic/Immunologic: Negative for environmental allergies and food allergies. Neurological:  Negative for dizziness, tremors, syncope, facial asymmetry, weakness, light-headedness, numbness and headaches. Hematological:  Does not bruise/bleed easily. Psychiatric/Behavioral:  Negative for agitation, behavioral problems and sleep disturbance.        Past Medical History:   Diagnosis Date    Asthma     Backache     COPD (chronic obstructive pulmonary disease) ([facility])     Salima De Jesus    Coronary artery disease      Daniel    Disease of blood and blood forming organ     anemia    Environmental allergies     Hyperlipidemia     Hypertension     Hypothyroidism     Type 2 diabetes mellitus (HCC)        Allergies   Allergen Reactions    Vicodin [Hydrocodone-Acetaminophen] Anxiety       Current Outpatient Medications   Medication Sig Dispense Refill    Misc Natural Products (FIBER 7 PO) Take by mouth      losartan (COZAAR) 25 MG tablet TAKE ONE TABLET BY MOUTH ONCE DAILY 90 tablet 3    apixaban (ELIQUIS) 5 MG TABS tablet TAKE ONE TABLET BY MOUTH TWICE DAILY 180 tablet 3    ezetimibe (ZETIA) 10 MG tablet Take 1 tablet by mouth daily 90 tablet 3    hydroCHLOROthiazide (HYDRODIURIL) 25 MG tablet Take 1 tablet by mouth daily 90 tablet 3    nitroGLYCERIN (NITROSTAT) 0.4 MG SL tablet Place 1 tablet under the tongue every 5 minutes as needed for Chest pain 25 tablet 3    rosuvastatin (CRESTOR) 40 MG tablet Take 1 tablet by mouth every evening 90 tablet 3    rosuvastatin (CRESTOR) 40 MG tablet Take 1 tablet by mouth every evening 90 tablet 3    amLODIPine (NORVASC) 5 MG tablet Take 1 tablet by mouth daily 90 tablet 3    Acetaminophen-Caff-Pyrilamine (MIDOL COMPLETE PO) Take by mouth      Multiple Vitamin (MULTIVITAMIN ADULT PO) Take by mouth      Loratadine (CLARITIN PO) Take by mouth      levothyroxine (SYNTHROID) 175 MCG tablet Take 175 mcg by mouth Daily      metFORMIN (GLUCOPHAGE-XR) 500 MG extended release tablet Take 1 tablet by mouth 2 times daily      latanoprost (XALATAN) 0.005 % ophthalmic solution Apply 1 drop to eye nightly      docusate sodium (COLACE) 100 MG capsule Take 100 mg by mouth daily as needed for Constipation      albuterol sulfate  (90 BASE) MCG/ACT inhaler Inhale 2 puffs into the lungs every 6 hours as needed for Wheezing       aspirin 81 MG chewable tablet Take 1 tablet by mouth daily 30 tablet 3    acetaminophen (TYLENOL) 325 MG tablet Take 650 mg by mouth every 6 hours as needed.         therapeutic multivitamin-minerals (THERAGRAN-M) tablet Take 1 tablet by mouth daily. No current facility-administered medications for this visit. Social History     Socioeconomic History    Marital status: Single     Spouse name: None    Number of children: None    Years of education: None    Highest education level: None   Tobacco Use    Smoking status: Every Day     Packs/day: 0.50     Types: Cigarettes    Smokeless tobacco: Never   Substance and Sexual Activity    Alcohol use: Yes     Comment: occasionally    Drug use: No    Sexual activity: Yes     Partners: Male       Family History   Problem Relation Age of Onset    Thyroid Disease Mother     Heart Disease Mother     Cancer Father     Diabetes Father     High Blood Pressure Father     Asthma Brother        Blood pressure 130/70, pulse (!) 43, resp. rate 22, height 5' 6\" (1.676 m), weight 234 lb 9.6 oz (106.4 kg). Physical Exam:    General Appearance: alert and oriented to person, place and time, in no acute distress  Cardiovascular: normal rate, regular rhythm, normal S1 and S2, no murmurs, rubs, clicks, or gallops, distal pulses intact, no carotid bruits, no JVD  Pulmonary/Chest: clear to auscultation bilaterally- no wheezes, rales or rhonchi, normal air movement, no respiratory distress  Abdomen: soft, non-tender, non-distended, normal bowel sounds, no masses   Extremities: no cyanosis, clubbing or edema, pulse   Skin: warm and dry, no rash or erythema  Head: normocephalic and atraumatic  Eyes: pupils equal, round, and reactive to light  Neck: supple and non-tender without mass, no thyromegaly   Musculoskeletal: normal range of motion, no joint swelling, deformity or tenderness  Neurological: alert, oriented, normal speech, no focal findings or movement disorder noted    Lab Data:    Cardiac Enzymes:  No results for input(s): CKTOTAL, CKMB, CKMBINDEX, TROPONINI in the last 72 hours.     CBC:   Lab Results   Component Value Date/Time    WBC 10.1 10/06/2022 11:47 AM    RBC 3.28 10/06/2022 11:47 AM    HGB 8.7 10/06/2022 11:47 AM    HCT 30.4 10/06/2022 11:47 AM     10/06/2022 11:47 AM       CMP:    Lab Results   Component Value Date/Time     10/06/2022 11:47 AM    K 5.3 10/06/2022 11:47 AM    K 3.9 10/03/2022 02:37 PM    CL 95 10/06/2022 11:47 AM    CO2 25 10/06/2022 11:47 AM    BUN 13 10/06/2022 11:47 AM    CREATININE 0.60 10/06/2022 11:47 AM    GFRAA >60 02/21/2022 09:18 AM    LABGLOM >60 10/06/2022 11:47 AM    GLUCOSE 100 10/06/2022 11:47 AM    CALCIUM 9.9 10/06/2022 11:47 AM       Hepatic Function Panel:    Lab Results   Component Value Date/Time    ALKPHOS 66 10/04/2022 04:01 AM    ALT 9 10/04/2022 04:01 AM    AST 13 10/04/2022 04:01 AM    PROT 6.7 10/04/2022 04:01 AM    BILITOT 0.3 10/04/2022 04:01 AM    BILIDIR 0.10 08/04/2022 10:46 AM    IBILI 0.33 08/04/2022 10:46 AM    LABALBU 3.4 10/04/2022 04:01 AM       Magnesium:  No results found for: MG    PT/INR:    Lab Results   Component Value Date/Time    INR 1.24 10/03/2022 05:21 PM       HgBA1c:  No results found for: LABA1C    FLP:    Lab Results   Component Value Date/Time    TRIG 202 08/04/2022 10:46 AM    HDL 31 08/04/2022 10:46 AM    LDLCALC 116 08/25/2021 06:02 AM    LDLDIRECT 104 12/30/2019 09:30 AM       TSH:    Lab Results   Component Value Date/Time    TSH 1.27 08/04/2022 10:46 AM        Diagnosis Orders   1. Hypertension, unspecified type  25546 - SC ELECTROCARDIOGRAM, COMPLETE           Assessment/Plan    Nora Amanda is a 51years old lady who recently had a hysterectomy surgery. The patient hemoglobin and October was 8 notes up to 11.3.     She has a history of coronary artery disease she had a heart cath prior to her surgery and she has significant disease of the OM moderate disease of the LAD she might need to have intervention she does complain of some chest pain but it is atypical in the patient with recent surgery we will continue medical treatment she was noticed to have any bradycardia and I discontinue her metoprolol and I placed her on the Norvasc 5 mg a day. She will be seen in 6 weeks for reevaluation if she continues to have the chest pain she will be considered for intervention of the OM. Patient is morbidly obese. Patient has history of diabetes mellitus history of paroxysmal atrial fibs she has been on the ER Eliquis. She has a history of hypertension has been under control history of hypothyroidism on hormonal replacement. The patient lab work the EKG findings the plan of treatment discussed with her in great details medication were reconciled and sent to her pharmacy metoprolol discontinued Norvasc 5 mg was started. Patient will be seen in 6 weeks if she continues to be symptomatic we will consider for intervention of the OM. We thank you very much for allowing us to share in the management of this patient end of dictation thank    Orders Placed This Encounter   Procedures    40122 - [place] ELECTROCARDIOGRAM, COMPLETE       Return in about 6 weeks (around 12/29/2022) for cad.      Fabio Lucas MD

## 2022-11-19 ENCOUNTER — HOSPITAL ENCOUNTER (EMERGENCY)
Age: 47
Discharge: HOME | End: 2022-11-19
Attending: EMERGENCY MEDICINE
Payer: COMMERCIAL

## 2022-11-19 VITALS
HEART RATE: 58 BPM | RESPIRATION RATE: 18 BRPM | OXYGEN SATURATION: 98 % | WEIGHT: 240 LBS | SYSTOLIC BLOOD PRESSURE: 123 MMHG | TEMPERATURE: 97.9 F | BODY MASS INDEX: 38.57 KG/M2 | HEIGHT: 66 IN | DIASTOLIC BLOOD PRESSURE: 81 MMHG

## 2022-11-19 DIAGNOSIS — T81.31XA DEHISCENCE OF OPERATIVE WOUND, INITIAL ENCOUNTER: Primary | ICD-10-CM

## 2022-11-19 PROCEDURE — 99282 EMERGENCY DEPT VISIT SF MDM: CPT | Performed by: EMERGENCY MEDICINE

## 2022-11-19 RX ORDER — OXYCODONE HYDROCHLORIDE 5 MG/1
5 TABLET ORAL EVERY 4 HOURS PRN
COMMUNITY

## 2022-11-19 RX ORDER — CEPHALEXIN 500 MG/1
500 CAPSULE ORAL 3 TIMES DAILY
COMMUNITY

## 2022-11-19 RX ORDER — SENNA PLUS 8.6 MG/1
1 TABLET ORAL DAILY
COMMUNITY

## 2022-11-19 RX ORDER — IBUPROFEN 600 MG/1
600 TABLET ORAL EVERY 6 HOURS PRN
COMMUNITY

## 2022-11-19 ASSESSMENT — ENCOUNTER SYMPTOMS
EYE DISCHARGE: 0
NAUSEA: 0
DIARRHEA: 0
RHINORRHEA: 0
ABDOMINAL PAIN: 0
CHEST TIGHTNESS: 0
EYE REDNESS: 0
SORE THROAT: 0
COLOR CHANGE: 0
VOMITING: 0
SHORTNESS OF BREATH: 0
CONSTIPATION: 0

## 2022-11-19 ASSESSMENT — PAIN - FUNCTIONAL ASSESSMENT: PAIN_FUNCTIONAL_ASSESSMENT: 0-10

## 2022-11-19 ASSESSMENT — PAIN DESCRIPTION - LOCATION: LOCATION: ABDOMEN

## 2022-11-19 ASSESSMENT — PAIN SCALES - GENERAL: PAINLEVEL_OUTOF10: 6

## 2022-11-19 NOTE — ED NOTES
Pt to ER due to post op problem. Pt had a hysterectomy procedure in [place] on 11/09/22 and her staples were removed yesterday. Lastnight at the base of her incision there was a small amount of drainage. Today the base of her incision is started to open.       Rivera Fuller RN  11/19/22 9550

## 2022-11-19 NOTE — ED NOTES
[facility]NCOUN[facility]          Pt Name: Karin Jaime  MRN: [id number]  [place] 1975  Date of evaluation: 11/19/2022  Treating Resident Physician: Clemente Ruff  Supervising Physician: Dr. Fareed Clarke       Chief Complaint   Patient presents with    Post-op Problem     History obtained from chart review and the patient. HISTORY OF PRESENT ILLNESS    HPI  Karin Jaime is a 49 y.o. female with PMHx of stent placement x 1 yr on eliquis, hiatal hernia repair x 5 months, s/p hysterectomy day 10 who presents to the emergency department for evaluation of post op hemorrhage. Pt states yesterday went to see surgeon to have sutures removed. Upon waking up this morning, pt states she noticed bleeding from the surgical site. Pt became worried and reported to ED. Pt denies fatigue and dizziness. Pt reports pain at the surgical site but denies any other pain. Pt denies recent heavy lifting and sexual intercourse. Pt denies recent fevers and chills. Pt denies noticing an purulent discharge. Pt denies n/v/d. Pt notes she is assign gas and having regular bowel movements. Pt currently on meds to control pain and antibiotics prescribed by surgeon. The patient has no other acute complaints at this time. REVIEW OF SYSTEMS   Review of Systems   Constitutional:  Negative for chills and fever. HENT:  Negative for rhinorrhea and sore throat. Eyes:  Negative for discharge and redness. Respiratory:  Negative for chest tightness and shortness of breath. Cardiovascular:  Negative for chest pain and leg swelling. Gastrointestinal:  Negative for constipation, diarrhea, nausea and vomiting. Genitourinary:  Negative for difficulty urinating, frequency and urgency. Musculoskeletal:  Negative for arthralgias and myalgias. Skin:  Positive for wound (abd wound bleeding). Negative for pallor.    Neurological:  Negative for dizziness and light-headedness. PAST MEDICAL AND SURGICAL HISTORY     Past Medical History:   Diagnosis Date    Asthma     Backache     COPD (chronic obstructive pulmonary disease) ([facility] [address]     José Miguel Severino    Coronary artery disease     Dr. Jovanny Lanza of blood and blood forming organ     anemia    Environmental allergies     Hyperlipidemia     Hypertension     Hypothyroidism     Type 2 diabetes mellitus ([facility] [address]      Past Surgical History:   Procedure Laterality Date    COLONOSCOPY      CORONARY ANGIOPLASTY WITH STENT PLACEMENT  [facility]    HERNIA REPAIR N/A 64/11/1689    Robotic Umbilical Hernia Repair with Mesh performed by Blanche Pineda MD at [address] ([facility])  11/09/2022    MOUTH SURGERY  2014    teeth removed    IN COLONOSCOPY FLX DX W/COLLJ SPEC WHEN PFRMD N/A 06/20/2018    COLONOSCOPY performed by Jake Villatoro MD at [facility]    IN EGD TRANSORAL BIOPSY SINGLE/MULTIPLE N/A 06/20/2018    EGD performed by Jake Villatoro MD at [facility]   No current facility-administered medications for this encounter.     Current Outpatient Medications:     oxyCODONE (ROXICODONE) 5 MG immediate release tablet, Take 5 mg by mouth every 4 hours as needed for Pain., Disp: , Rfl:     senna (SENOKOT) 8.6 MG tablet, Take 1 tablet by mouth daily, Disp: , Rfl:     ibuprofen (ADVIL;MOTRIN) 600 MG tablet, Take 600 mg by mouth every 6 hours as needed for Pain, Disp: , Rfl:     cephALEXin (KEFLEX) 500 MG capsule, Take 500 mg by mouth 3 times daily, Disp: , Rfl:     Misc Natural Products (FIBER 7 PO), Take by mouth, Disp: , Rfl:     losartan (COZAAR) 25 MG tablet, TAKE ONE TABLET BY MOUTH ONCE DAILY, Disp: 90 tablet, Rfl: 3    apixaban (ELIQUIS) 5 MG TABS tablet, TAKE ONE TABLET BY MOUTH TWICE DAILY, Disp: 180 tablet, Rfl: 3    ezetimibe (ZETIA) 10 MG tablet, Take 1 tablet by mouth daily, Disp: 90 tablet, Rfl: 3    hydroCHLOROthiazide (HYDRODIURIL) 25 MG tablet, Take 1 tablet by mouth daily, Disp: 90 tablet, Rfl: 3    nitroGLYCERIN (NITROSTAT) 0.4 MG SL tablet, Place 1 tablet under the tongue every 5 minutes as needed for Chest pain, Disp: 25 tablet, Rfl: 3    rosuvastatin (CRESTOR) 40 MG tablet, Take 1 tablet by mouth every evening, Disp: 90 tablet, Rfl: 3    rosuvastatin (CRESTOR) 40 MG tablet, Take 1 tablet by mouth every evening, Disp: 90 tablet, Rfl: 3    amLODIPine (NORVASC) 5 MG tablet, Take 1 tablet by mouth daily, Disp: 90 tablet, Rfl: 3    Acetaminophen-Caff-Pyrilamine (MIDOL COMPLETE PO), Take by mouth, Disp: , Rfl:     Multiple Vitamin (MULTIVITAMIN ADULT PO), Take by mouth, Disp: , Rfl:     Loratadine (CLARITIN PO), Take by mouth, Disp: , Rfl:     levothyroxine (SYNTHROID) 175 MCG tablet, Take 175 mcg by mouth Daily, Disp: , Rfl:     metFORMIN (GLUCOPHAGE-XR) 500 MG extended release tablet, Take 1 tablet by mouth 2 times daily, Disp: , Rfl:     latanoprost (XALATAN) 0.005 % ophthalmic solution, Apply 1 drop to eye nightly, Disp: , Rfl:     docusate sodium (COLACE) 100 MG capsule, Take 100 mg by mouth daily as needed for Constipation, Disp: , Rfl:     albuterol sulfate  (90 BASE) MCG/ACT inhaler, Inhale 2 puffs into the lungs every 6 hours as needed for Wheezing , Disp: , Rfl:     aspirin 81 MG chewable tablet, Take 1 tablet by mouth daily, Disp: 30 tablet, Rfl: 3    acetaminophen (TYLENOL) 325 MG tablet, Take 650 mg by mouth every 6 hours as needed. , Disp: , Rfl:     therapeutic multivitamin-minerals (THERAGRAN-M) tablet, Take 1 tablet by mouth daily.   , Disp: , Rfl:       SOCIAL HISTORY     Social History     Social History Narrative    Not on file     Social History     Tobacco Use    Smoking status: Former     Packs/day: 0.50     Types: Cigarettes    Smokeless tobacco: Never   Substance Use Topics    Alcohol use: Yes     Comment: occasionally    Drug use: No         ALLERGIES     Allergies   Allergen Reactions    Vicodin [Hydrocodone-Acetaminophen] Anxiety         FAMILY HISTORY     Family History   Problem Relation Age of Onset    Thyroid Disease Mother     Heart Disease Mother     Cancer Father     Diabetes Father     High Blood Pressure Father     Asthma Brother          PREVIOUS RECORDS   Previous records reviewed: I reviewed the patient's past medical records including relevant labs, imaging and procedures. PHYSICAL EXAM     ED Triage Vitals [11/19/22 0802]   BP Temp Temp Source Heart Rate Resp SpO2 Height Weight   117/64 97.9 °F (36.6 °C) Oral 58 18 98 % 5' 6\" (1.676 m) 240 lb (108.9 kg)     Initial vital signs and nursing assessment reviewed and normal. Body mass index is 38.74 kg/m². Pulsoximetry is normal per my interpretation. Additional Vital Signs:  Vitals:    11/19/22 0802   BP: 117/64   Pulse: 58   Resp: 18   Temp: 97.9 °F (36.6 °C)   SpO2: 98%       Physical Exam  Constitutional:       General: She is not in acute distress. Appearance: Normal appearance. She is obese. She is not ill-appearing or diaphoretic. HENT:      Head: Normocephalic and atraumatic. Mouth/Throat:      Mouth: Mucous membranes are moist.      Pharynx: No posterior oropharyngeal erythema. Eyes:      Extraocular Movements: Extraocular movements intact. Pupils: Pupils are equal, round, and reactive to light. Cardiovascular:      Rate and Rhythm: Normal rate and regular rhythm. Pulses: Normal pulses. Heart sounds: Normal heart sounds. No murmur heard. Pulmonary:      Effort: Pulmonary effort is normal. No respiratory distress. Breath sounds: Normal breath sounds. No stridor. No wheezing, rhonchi or rales. Abdominal:      General: Bowel sounds are normal.      Palpations: Abdomen is soft. Tenderness: There is abdominal tenderness (point tenderness at the inferior apex of incision). Comments: 30 cm midline incision with 2x5mm inferior apex opening.   Minimally Exposed Adipose tissue  Wound well appearing, no active bleeding, no purulent drainage, no surrounding erythema, no clinical signs of infection. Musculoskeletal:         General: Normal range of motion. Cervical back: Normal range of motion and neck supple. Skin:     General: Skin is warm and dry. Coloration: Skin is not pale. Neurological:      General: No focal deficit present. Mental Status: She is alert and oriented to person, place, and time. Psychiatric:         Mood and Affect: Mood normal.         Behavior: Behavior normal.           ED RESULTS   Laboratory results:  Labs Reviewed - No data to display    Radiologic studies results:  No orders to display       ED Medications administered this visit: Medications - No data to display      ED COURSE      After observing pts wound, incision is well appearing with no current active bleeding or signs of infection. 4860- Dressing reinforced   2241- Updated Dr. Tommie Helton, pts surgeon, on pts current condition and visit in ED. Dr Etelvina Manriquez agrees with current POC and would like pt to continue with POC established post op. MEDICAL DECISION MAKING   Given the patient's above chief complaint and findings on history and physical examination, I thought it was appropriate to consider the following emergency medical conditions:  Wound dehiscence, wound infection, prolonged healing but secondary intention   Although some of these diagnoses are unlikely they were considered in my medical decision making. Cellulitis, necrotizing fascitis        Discussed proper at home management. Discussed plan for discharge. Pt will be discharged with instructions of close f/u with surgeon. Strict return precautions and follow up instructions were discussed with the patient prior to discharge, with which the patient agrees and denies further questions.     MEDICATION CHANGES     New Prescriptions    No medications on file         FINAL DISPOSITION     Final diagnoses:   None     Condition: condition: stable  Dispo: Discharge to home      This transcription was electronically signed. Parts of this transcriptions may have been dictated by use of voice recognition software and electronically transcribed, and parts may have been transcribed with the assistance of an ED scribe. The transcription may contain errors not detected in proofreading. Please refer to my supervising physician's documentation if my documentation differs.     Electronically Signed: Rainer Jesus, 11/19/22, 8:37 AM        Rainer Jesus  11/19/22 0958

## 2022-11-19 NOTE — ED PROVIDER NOTES
[facility] ENCOUN[facility]          Pt Name: Anthony Bustos  MRN: [id number]  [place] 1975  Date of evaluation: 11/19/2022  Emergency Physician: Dannie Posada DO    CHIEF COMPLAINT       Chief Complaint   Patient presents with    Post-op Problem     History obtained from the patient. HISTORY OF PRESENT ILLNESS    HPI  Anthony Bustos is a 49 y.o. female who presents to the emergency department for evaluation of incisional bleeding. Patient is postop day 10 DONNA with laparotomy due to tumor removal.  She is following with [facility] multidisciplinary surgical team.  She was seen in the office after initial wound dehiscence. The bottom 2 cm of her wound had spread apart after staple removal.  She was placed on Keflex and her wound was reinforced with Steri-Strips. This morning she woke up with a small amount blood on her abdominal binder and sheets. Bleeding is since controlled. No falls. No injury. No lifting. No purulent drainage no pain and no wound redness. The patient has no other acute complaints at this time. REVIEW OF SYSTEMS   Review of Systems   Gastrointestinal:  Negative for abdominal pain, nausea and vomiting. Skin:  Positive for wound. Negative for color change. Hematological:  Does not bruise/bleed easily.        PAST MEDICAL AND SURGICAL HISTORY     Past Medical History:   Diagnosis Date    Asthma     Backache     COPD (chronic obstructive pulmonary disease) ([facility] [address]     Hiral Gonzales    Coronary artery disease     Dr. Justin Yun of blood and blood forming organ     anemia    Environmental allergies     Hyperlipidemia     Hypertension     Hypothyroidism     Type 2 diabetes mellitus ([facility] [address]      Past Surgical History:   Procedure Laterality Date    COLONOSCOPY      CORONARY ANGIOPLASTY WITH STENT PLACEMENT  2016    [facility]    HERNIA REPAIR N/A 90/38/1834    Robotic Umbilical Hernia Repair with Mesh performed by Dwayne Finnegan Buster Medina MD at [facility] ([address] [place] [address])  11/09/2022    MOUTH SURGERY  2014    teeth removed    DE COLONOSCOPY FLX DX W/COLLJ SPEC WHEN PFRMD N/A 06/20/2018    COLONOSCOPY performed by Unknown MD Francesca at [facility]    DE EGD TRANSORAL BIOPSY SINGLE/MULTIPLE N/A 06/20/2018    EGD performed by Unknown MD Francesca at [address]   No current facility-administered medications for this encounter.     Current Outpatient Medications:     oxyCODONE (ROXICODONE) 5 MG immediate release tablet, Take 5 mg by mouth every 4 hours as needed for Pain., Disp: , Rfl:     senna (SENOKOT) 8.6 MG tablet, Take 1 tablet by mouth daily, Disp: , Rfl:     ibuprofen (ADVIL;MOTRIN) 600 MG tablet, Take 600 mg by mouth every 6 hours as needed for Pain, Disp: , Rfl:     cephALEXin (KEFLEX) 500 MG capsule, Take 500 mg by mouth 3 times daily, Disp: , Rfl:     Misc Natural Products (FIBER 7 PO), Take by mouth, Disp: , Rfl:     losartan (COZAAR) 25 MG tablet, TAKE ONE TABLET BY MOUTH ONCE DAILY, Disp: 90 tablet, Rfl: 3    apixaban (ELIQUIS) 5 MG TABS tablet, TAKE ONE TABLET BY MOUTH TWICE DAILY, Disp: 180 tablet, Rfl: 3    ezetimibe (ZETIA) 10 MG tablet, Take 1 tablet by mouth daily, Disp: 90 tablet, Rfl: 3    hydroCHLOROthiazide (HYDRODIURIL) 25 MG tablet, Take 1 tablet by mouth daily, Disp: 90 tablet, Rfl: 3    nitroGLYCERIN (NITROSTAT) 0.4 MG SL tablet, Place 1 tablet under the tongue every 5 minutes as needed for Chest pain, Disp: 25 tablet, Rfl: 3    rosuvastatin (CRESTOR) 40 MG tablet, Take 1 tablet by mouth every evening, Disp: 90 tablet, Rfl: 3    rosuvastatin (CRESTOR) 40 MG tablet, Take 1 tablet by mouth every evening, Disp: 90 tablet, Rfl: 3    amLODIPine (NORVASC) 5 MG tablet, Take 1 tablet by mouth daily, Disp: 90 tablet, Rfl: 3    Acetaminophen-Caff-Pyrilamine (MIDOL COMPLETE PO), Take by mouth, Disp: , Rfl:     Multiple Vitamin (MULTIVITAMIN ADULT PO), Take by mouth, Disp: , Rfl:     Loratadine (CLARITIN PO), Take by mouth, Disp: , Rfl:     levothyroxine (SYNTHROID) 175 MCG tablet, Take 175 mcg by mouth Daily, Disp: , Rfl:     metFORMIN (GLUCOPHAGE-XR) 500 MG extended release tablet, Take 1 tablet by mouth 2 times daily, Disp: , Rfl:     latanoprost (XALATAN) 0.005 % ophthalmic solution, Apply 1 drop to eye nightly, Disp: , Rfl:     docusate sodium (COLACE) 100 MG capsule, Take 100 mg by mouth daily as needed for Constipation, Disp: , Rfl:     albuterol sulfate  (90 BASE) MCG/ACT inhaler, Inhale 2 puffs into the lungs every 6 hours as needed for Wheezing , Disp: , Rfl:     aspirin 81 MG chewable tablet, Take 1 tablet by mouth daily, Disp: 30 tablet, Rfl: 3    acetaminophen (TYLENOL) 325 MG tablet, Take 650 mg by mouth every 6 hours as needed. , Disp: , Rfl:     therapeutic multivitamin-minerals (THERAGRAN-M) tablet, Take 1 tablet by mouth daily. , Disp: , Rfl:       SOCIAL HISTORY     Social History     Social History Narrative    Not on file     Social History     Tobacco Use    Smoking status: Former     Packs/day: 0.50     Types: Cigarettes    Smokeless tobacco: Never   Substance Use Topics    Alcohol use: Yes     Comment: occasionally    Drug use: No         ALLERGIES     Allergies   Allergen Reactions    Vicodin [Hydrocodone-Acetaminophen] Anxiety         FAMILY HISTORY     Family History   Problem Relation Age of Onset    Thyroid Disease Mother     Heart Disease Mother     Cancer Father     Diabetes Father     High Blood Pressure Father     Asthma Brother          PHYSICAL EXAM     ED Triage Vitals [11/19/22 0802]   BP Temp Temp Source Heart Rate Resp SpO2 Height Weight   117/64 97.9 °F (36.6 °C) Oral 58 18 98 % 5' 6\" (1.676 m) 240 lb (108.9 kg)         Additional Vital Signs:  Vitals:    11/19/22 0802   BP: 117/64   Pulse: 58   Resp: 18   Temp: 97.9 °F (36.6 °C)   SpO2: 98%       Physical Exam  Vitals and nursing note reviewed. Constitutional:       General: She is not in acute distress. Appearance: She is not ill-appearing. HENT:      Head: Normocephalic and atraumatic. Right Ear: Tympanic membrane normal.      Left Ear: Tympanic membrane normal.      Mouth/Throat:      Mouth: Mucous membranes are dry. Eyes:      Extraocular Movements: Extraocular movements intact. Pupils: Pupils are equal, round, and reactive to light. Cardiovascular:      Rate and Rhythm: Normal rate and regular rhythm. Pulses: Normal pulses. Heart sounds: Normal heart sounds. Abdominal:      General: Abdomen is flat. Palpations: Abdomen is soft. Tenderness: There is no abdominal tenderness. Comments: Ventral midline incision. 30 cm. Bottom 2 cm with wound dehiscence clear borders. Small subcutaneous fat exposed. No surrounding erythema. No drainage. No bleeding. Steri-Strips previously in place. Nontender no palpable fluctuance or masses   Neurological:      Mental Status: She is alert. Initial vital signs and nursing assessment reviewed and normal.   Pulsoximetry is normal per my interpretation. MEDICAL DECISION MAKING   Initial Assessment: Given the patient's above chief complaint and findings on history and physical examination, I thought it was appropriate to consider the following emergency medical conditions: Wound dehiscence, infection, bleeding, anemia, mechanical dehiscence, although some of these diagnoses are unlikely they were considered in my medical decision making. Plan: Patient's incision reportedly has not opened more compared to previous office visit. In which Steri-Strips were placed and patient was placed on Keflex. Primary concern was waking up with bleeding this morning. Bleeding has since resolved. Plan is to reinforce Steri-Strips and dressing.   Notify primary surgeon        ED RESULTS   Laboratory results:  Labs Reviewed - No data to display    Radiologic studies results:  No orders to display       ED Medications administered this visit: Medications - No data to display      ED COURSE     ED Course as of 11/19/22 0949   Sat Nov 19, 2022   0948 Steri-Strips reinforced/reapplied. Dressing reinforced. Notified patient's primary surgical team.  Patient is recommended to continue bandaging twice a day and wear abdominal binder. Continue to sleep in a recliner. [DD]      ED Course User Index  [DD] Judith Coronado DO         The diagnosis, extensive differential diagnosis, laboratory and imaging findings were discussed at the bedside. The patient was an active participant in their care. They are agreeable to the plan of care. All questions and concerns were addressed at the time of the encounter. MEDICATION CHANGES     DISCHARGE MEDICATIONS:  Current Discharge Medication List               FINAL DISPOSITION     Final diagnoses:   Dehiscence of operative wound, initial encounter     Condition: condition: good  Dispo: Discharge to home    PATIENT REFERRED TO:  Yadira Aburto MD  [address] [place] [address] Dr Slim Villeda [address] [place] [address]  [phone]    Schedule an appointment as soon as possible for a visit in 2 days        Critical Care Time   None      This transcription was electronically signed. Parts of this transcriptions may have been dictated by use of voice recognition software and electronically transcribed, and parts may have been transcribed with the assistance of an ED scribe. The transcription may contain errors not detected in proofreading.     Electronically Signed: Judith Coronado DO, 11/19/22, 9:34 AM      Judith Coronado DO  11/20/22 6266

## 2022-11-19 NOTE — DISCHARGE INSTRUCTIONS
Apply bandage and ABD pad twice a day. Do not lift greater than 10 pounds. You should sleep in a recliner. Be sure to follow-up with your surgeon early this week. Return the ED immediately if you have any new or changing symptoms such as bleeding, green or yellow drainage, wound redness, further opening of your wound, or you have any other concerns. You should continue your previously prescribed antibiotics.

## 2022-11-21 ENCOUNTER — HOSPITAL ENCOUNTER (EMERGENCY)
Age: 47
Discharge: HOME | End: 2022-11-22
Attending: EMERGENCY MEDICINE
Payer: COMMERCIAL

## 2022-11-21 DIAGNOSIS — Z48.89 ENCOUNTER FOR POST SURGICAL WOUND CHECK: Primary | ICD-10-CM

## 2022-11-21 PROCEDURE — 99283 EMERGENCY DEPT VISIT LOW MDM: CPT | Performed by: EMERGENCY MEDICINE

## 2022-11-21 ASSESSMENT — PAIN - FUNCTIONAL ASSESSMENT: PAIN_FUNCTIONAL_ASSESSMENT: NONE - DENIES PAIN

## 2022-11-22 VITALS
HEIGHT: 66 IN | BODY MASS INDEX: 37.93 KG/M2 | DIASTOLIC BLOOD PRESSURE: 79 MMHG | RESPIRATION RATE: 18 BRPM | TEMPERATURE: 98.3 F | OXYGEN SATURATION: 96 % | WEIGHT: 236 LBS | HEART RATE: 51 BPM | SYSTOLIC BLOOD PRESSURE: 142 MMHG

## 2022-11-22 LAB
ANION GAP SERPL CALCULATED.3IONS-SCNC: 10 MEQ/L (ref 8–16)
BASOPHILS # BLD: 0.6 %
BASOPHILS ABSOLUTE: 0 THOU/MM3 (ref 0–0.1)
BUN BLDV-MCNC: 14 MG/DL (ref 7–22)
CALCIUM SERPL-MCNC: 10 MG/DL (ref 8.5–10.5)
CHLORIDE BLD-SCNC: 100 MEQ/L (ref 98–111)
CO2: 26 MEQ/L (ref 23–33)
CREAT SERPL-MCNC: 0.7 MG/DL (ref 0.4–1.2)
EOSINOPHIL # BLD: 2.4 %
EOSINOPHILS ABSOLUTE: 0.2 THOU/MM3 (ref 0–0.4)
ERYTHROCYTE [DISTWIDTH] IN BLOOD BY AUTOMATED COUNT: 16.6 % (ref 11.5–14.5)
ERYTHROCYTE [DISTWIDTH] IN BLOOD BY AUTOMATED COUNT: 50.1 FL (ref 35–45)
GFR SERPL CREATININE-BSD FRML MDRD: > 60 ML/MIN/1.73M2
GLUCOSE BLD-MCNC: 123 MG/DL (ref 70–108)
HCT VFR BLD CALC: 31.8 % (ref 37–47)
HEMOGLOBIN: 9.5 GM/DL (ref 12–16)
IMMATURE GRANS (ABS): 0.05 THOU/MM3 (ref 0–0.07)
IMMATURE GRANULOCYTES: 0.7 %
INR BLD: 1.34 (ref 0.85–1.13)
LYMPHOCYTES # BLD: 27.1 %
LYMPHOCYTES ABSOLUTE: 1.8 THOU/MM3 (ref 1–4.8)
MCH RBC QN AUTO: 25.1 PG (ref 26–33)
MCHC RBC AUTO-ENTMCNC: 29.9 GM/DL (ref 32.2–35.5)
MCV RBC AUTO: 84.1 FL (ref 81–99)
MONOCYTES # BLD: 6.3 %
MONOCYTES ABSOLUTE: 0.4 THOU/MM3 (ref 0.4–1.3)
NUCLEATED RED BLOOD CELLS: 0 /100 WBC
OSMOLALITY CALCULATION: 273.8 MOSMOL/KG (ref 275–300)
PLATELET # BLD: 317 THOU/MM3 (ref 130–400)
PMV BLD AUTO: 11.5 FL (ref 9.4–12.4)
POTASSIUM SERPL-SCNC: 4.7 MEQ/L (ref 3.5–5.2)
RBC # BLD: 3.78 MILL/MM3 (ref 4.2–5.4)
SEG NEUTROPHILS: 62.9 %
SEGMENTED NEUTROPHILS ABSOLUTE COUNT: 4.2 THOU/MM3 (ref 1.8–7.7)
SODIUM BLD-SCNC: 136 MEQ/L (ref 135–145)
WBC # BLD: 6.7 THOU/MM3 (ref 4.8–10.8)

## 2022-11-22 PROCEDURE — 85610 PROTHROMBIN TIME: CPT

## 2022-11-22 PROCEDURE — 85025 COMPLETE CBC W/AUTO DIFF WBC: CPT

## 2022-11-22 PROCEDURE — 36415 COLL VENOUS BLD VENIPUNCTURE: CPT

## 2022-11-22 PROCEDURE — 80048 BASIC METABOLIC PNL TOTAL CA: CPT

## 2022-11-22 PROCEDURE — 2500000003 HC RX 250 WO HCPCS: Performed by: EMERGENCY MEDICINE

## 2022-11-22 RX ORDER — TRANEXAMIC ACID 100 MG/ML
1000 INJECTION, SOLUTION INTRAVENOUS ONCE
Status: COMPLETED | OUTPATIENT
Start: 2022-11-22 | End: 2022-11-22

## 2022-11-22 RX ADMIN — TRANEXAMIC ACID 1000 MG: 100 INJECTION, SOLUTION INTRAVENOUS at 01:21

## 2022-11-22 ASSESSMENT — ENCOUNTER SYMPTOMS
NAUSEA: 0
SORE THROAT: 0
CONSTIPATION: 0
BLOOD IN STOOL: 0
COUGH: 0
RHINORRHEA: 0
SHORTNESS OF BREATH: 0
VOMITING: 0
DIARRHEA: 0
ABDOMINAL PAIN: 0

## 2022-11-22 ASSESSMENT — PAIN - FUNCTIONAL ASSESSMENT
PAIN_FUNCTIONAL_ASSESSMENT: NONE - DENIES PAIN
PAIN_FUNCTIONAL_ASSESSMENT: NONE - DENIES PAIN

## 2022-11-22 NOTE — ED NOTES
Pt and vs reassessed. RR easy and unlabored. Pt resting in bed alert after using the wheelchair with assistance from RN to use the bathroom. Pt tolerated getting up well. Bleeding began at the incision site. RN placed surgifoam on site and bleeding controlled at this time.  Pt stable and denies any other needs at this time      Carolyn Mode, RN  11/21/22 7836

## 2022-11-22 NOTE — ED PROVIDER NOTES
[facility] ENCOUN[facility]          Pt Name: Ruslan Barron  MRN: [id number]  [place] 1975  Date of evaluation: 11/21/2022    History obtained from the patient. CHIEF COMPLAINT       Chief Complaint   Patient presents with    Incisional Pain       Nurses Notes reviewed and I agree except as noted in the HPI. HISTORY OF PRESENT ILLNESS    Ruslan Barron is a 49 y.o. pleasant female who presents to the emergency department for evaluation of bleeding from incision. Patient underwent a DONNA with laparotomy and tumor removal at [facility], she is currently postop day 13. She is on Eliquis chronically in addition to baby aspirin. She never had to stop her baby aspirin and during her operative course and resumed her Eliquis 2 days after her surgery. She denies any bleeding from the gums, hematuria, black or bloody stools or nosebleeds. She was seen in the emergency department recently due to a bleeding episode which had improved on arrival.  Tonight patient sat down on the toilet to urinate and felt the incision started to leak, began to bleed. She applied to ABD pads and a washcloth and 911 was called. She states that currently bleeding has been controlled and stopped. No purulent drainage, redness or abdominal pain. No incisional pain. She is having regular bowel movements without difficulty. No nausea or vomiting. No fever. The patient has no other acute complaints at this time. REVIEW OF SYSTEMS   Review of Systems   Constitutional:  Negative for diaphoresis and fever. HENT:  Negative for congestion, rhinorrhea and sore throat. Eyes:  Negative for visual disturbance. Respiratory:  Negative for cough and shortness of breath. Cardiovascular:  Negative for chest pain, palpitations and leg swelling. Gastrointestinal:  Negative for abdominal pain, blood in stool, constipation, diarrhea, nausea and vomiting.    Endocrine: Negative for polyuria. Genitourinary:  Negative for difficulty urinating and dysuria. Musculoskeletal:  Negative for joint swelling. Skin:  Negative for rash. Neurological:  Negative for seizures, syncope, facial asymmetry, speech difficulty, weakness and headaches. Hematological:  Negative for adenopathy. Bruises/bleeds easily. Psychiatric/Behavioral:  Negative for confusion. All other systems reviewed and are negative. PAST MEDICAL AND SURGICAL HISTORY     Past Medical History:   Diagnosis Date    Asthma     Backache     COPD (chronic obstructive pulmonary disease) ([facility] [address]     Hiral Gonzales    Coronary artery disease     Dr. Justin Yun of blood and blood forming organ     anemia    Environmental allergies     Hyperlipidemia     Hypertension     Hypothyroidism     Type 2 diabetes mellitus ([facility] [address]      Past Surgical History:   Procedure Laterality Date    COLONOSCOPY      CORONARY ANGIOPLASTY WITH STENT PLACEMENT  2016    [facility]    HERNIA REPAIR N/A 07/41/0006    Robotic Umbilical Hernia Repair with Mesh performed by Melba Sanderson MD at [facility] ([address] [place] [address])  11/09/2022    MOUTH SURGERY  2014    teeth removed    SD COLONOSCOPY FLX DX W/COLLJ SPEC WHEN PFRMD N/A 06/20/2018    COLONOSCOPY performed by Kayce Cardenas MD at [facility]    SD EGD TRANSORAL BIOPSY SINGLE/MULTIPLE N/A 06/20/2018    EGD performed by Kayce Cardenas MD at [address] [place] [address]   No current facility-administered medications for this encounter.     Current Outpatient Medications:     oxyCODONE (ROXICODONE) 5 MG immediate release tablet, Take 5 mg by mouth every 4 hours as needed for Pain., Disp: , Rfl:     senna (SENOKOT) 8.6 MG tablet, Take 1 tablet by mouth daily, Disp: , Rfl:     ibuprofen (ADVIL;MOTRIN) 600 MG tablet, Take 600 mg by mouth every 6 hours as needed for Pain, Disp: , Rfl:     cephALEXin (KEFLEX) 500 MG capsule, Take 500 mg by mouth 3 times daily, Disp: , Rfl:     Misc Natural Products (FIBER 7 PO), Take by mouth, Disp: , Rfl:     losartan (COZAAR) 25 MG tablet, TAKE ONE TABLET BY MOUTH ONCE DAILY, Disp: 90 tablet, Rfl: 3    apixaban (ELIQUIS) 5 MG TABS tablet, TAKE ONE TABLET BY MOUTH TWICE DAILY, Disp: 180 tablet, Rfl: 3    ezetimibe (ZETIA) 10 MG tablet, Take 1 tablet by mouth daily, Disp: 90 tablet, Rfl: 3    hydroCHLOROthiazide (HYDRODIURIL) 25 MG tablet, Take 1 tablet by mouth daily, Disp: 90 tablet, Rfl: 3    nitroGLYCERIN (NITROSTAT) 0.4 MG SL tablet, Place 1 tablet under the tongue every 5 minutes as needed for Chest pain, Disp: 25 tablet, Rfl: 3    rosuvastatin (CRESTOR) 40 MG tablet, Take 1 tablet by mouth every evening, Disp: 90 tablet, Rfl: 3    rosuvastatin (CRESTOR) 40 MG tablet, Take 1 tablet by mouth every evening, Disp: 90 tablet, Rfl: 3    amLODIPine (NORVASC) 5 MG tablet, Take 1 tablet by mouth daily, Disp: 90 tablet, Rfl: 3    Acetaminophen-Caff-Pyrilamine (MIDOL COMPLETE PO), Take by mouth, Disp: , Rfl:     Multiple Vitamin (MULTIVITAMIN ADULT PO), Take by mouth, Disp: , Rfl:     Loratadine (CLARITIN PO), Take by mouth, Disp: , Rfl:     levothyroxine (SYNTHROID) 175 MCG tablet, Take 175 mcg by mouth Daily, Disp: , Rfl:     metFORMIN (GLUCOPHAGE-XR) 500 MG extended release tablet, Take 1 tablet by mouth 2 times daily, Disp: , Rfl:     latanoprost (XALATAN) 0.005 % ophthalmic solution, Apply 1 drop to eye nightly, Disp: , Rfl:     docusate sodium (COLACE) 100 MG capsule, Take 100 mg by mouth daily as needed for Constipation, Disp: , Rfl:     albuterol sulfate  (90 BASE) MCG/ACT inhaler, Inhale 2 puffs into the lungs every 6 hours as needed for Wheezing , Disp: , Rfl:     aspirin 81 MG chewable tablet, Take 1 tablet by mouth daily, Disp: 30 tablet, Rfl: 3    acetaminophen (TYLENOL) 325 MG tablet, Take 650 mg by mouth every 6 hours as needed.   , Disp: , Rfl:     therapeutic multivitamin-minerals (THERAGRAN-M) tablet, Take 1 tablet by mouth daily. , Disp: , Rfl:     ALLERGIES     Allergies   Allergen Reactions    Vicodin [Hydrocodone-Acetaminophen] Anxiety       SOCIAL HISTORY     Social History     Social History Narrative    Not on file     Social History     Tobacco Use    Smoking status: Former     Packs/day: 0.50     Types: Cigarettes    Smokeless tobacco: Never   Substance Use Topics    Alcohol use: Yes     Comment: occasionally    Drug use: No       FAMILY HISTORY     Family History   Problem Relation Age of Onset    Thyroid Disease Mother     Heart Disease Mother     Cancer Father     Diabetes Father     High Blood Pressure Father     Asthma Brother        PHYSICAL EXAM     ED Triage Vitals [11/21/22 2257]   BP Temp Temp src Heart Rate Resp SpO2 Height Weight   (!) 133/94 98.3 °F (36.8 °C) -- 55 18 97 % 5' 6\" (1.676 m) 236 lb (107 kg)       Additional Vital Signs:  Vitals:    11/22/22 0121   BP: (!) 142/79   Pulse: 51   Resp: 18   Temp:    SpO2: 96%       CONSTITUTIONAL: [Awake, alert, non toxic, well developed, well nourished, no acute distress]  HEAD: [Normocephalic, atraumatic]  EYES: [Pupils equal, round & reactive to light, extraocular movements intact, no nystagmus, clear conjunctiva, non-icteric sclera]  ENT: [External ear canal clear without evidence of cerumen impaction or foreign body, TM's clear without erythema or bulging. Nares patent without drainage, septum appears midline. Moist mucus membranes, oropharynx clear without exudate, erythema, or mass. Uvula midline]  NECK: [Nontender and supple. No meningismus, no appreciated lymphadenopathy. Intact full range of motion. C-spine midline without vertebral tenderness. Trachea midline.]  CHEST: [Inspection normal, no lesions, equal rise. No crepitus or tenderness upon palpation.]  CARDIOVASCULAR: [Regular rate, rhythm, normal S1 and S2. No appreciated murmurs, rubs, or gallops. No pulse deficits appreciated. Intact distal perfusion.  JVD not appreciated.]  PULMONARY: [Respiratory distress absent. Respiratory effort normal. Breath sounds clear to auscultation without rhonchi, rales, or wheezing. No accessory muscle use. No stridor]  ABDOMEN: [Inspection normal, with healing surgical scars. Wound had been cleaned by nursing staff with Surgifoam applied prior to my exam.  Area of dehiscence appears to be healing well, there is no active bleeding. Soft, non-tender, non-distended, with normoactive bowel sounds. No palpable masses, rebound, or guarding]  BACK: [Intact ROM. No midline vertebral tenderness, step off, or crepitus. No CVA tenderness.]  MUSCULOSKELETAL: [Extremities nontender to palpation. No gross deformity or evidence of external trauma. Intact range of motion. Sensation intact. No clubbing, cyanosis, or edema.]  SKIN: [Warm, dry. No jaundice, rash, urticaria, or petechiae]  NEUROLOGIC: [Alert and oriented x 3, GCS 15, normal mentation for age. Moves all four extremities. No gross sensory deficit. Cerebellar function grossly normal.]  PSYCHIATRIC: [Normal mood and affect, thought process is clear and linear]         MEDICAL DECISION MAKING   Initial Assessment:   77-year-old female who is postop day 13 from [facility] with laparotomy and tumor removal, was seen in the emergency department on the 19th due to an episode of bleeding from her incision site. Had another episode of bleeding tonight. This was controlled after cleaning with Surgifoam in the emergency department. Differential diagnosis includes but is not limited to bleeding due to aspirin and Eliquis use    My imaging interpretation: (none if blank)    Decision rules/clinical scores utilized in MDM:       Plan:   Check CBC, chemistry and PT/INR  Monitor patient for rebleeding after Surgifoam has been applied. We will additionally add TXA and gauze. No active bleeding in the emergency department at time of initial evaluation. Image of wound in the emergency department was uploaded into the system.   If patient remains stable, we will provide her with additional supplies to use at home for dressing changes. Suspect patient's dressing changed tonight dislodged clot or crust at the surface of the area of dehiscence which led to bleeding episode. PREVIOUS RECORDS     Previous visit summary and patient history available on EMR  and was reviewed. DIAGNOSTIC RESULTS       RADIOLOGY: non-plain film images(s) such as CT,Ultrasound and MRI are read by the radiologist.    No orders to display     [] Visualized and interpreted by me   [] Radiologist's Wet Read Report Reviewed   [] Discussed withRadiologist.    LABS:   Labs Reviewed   CBC WITH AUTO DIFFERENTIAL - Abnormal; Notable for the following components:       Result Value    RBC 3.78 (*)     Hemoglobin 9.5 (*)     Hematocrit 31.8 (*)     MCH 25.1 (*)     MCHC 29.9 (*)     RDW-CV 16.6 (*)     RDW-SD 50.1 (*)     All other components within normal limits   BASIC METABOLIC PANEL - Abnormal; Notable for the following components:    Glucose 123 (*)     All other components within normal limits   PROTIME-INR - Abnormal; Notable for the following components:    INR 1.34 (*)     All other components within normal limits   OSMOLALITY - Abnormal; Notable for the following components:    Osmolality Calc 273.8 (*)     All other components within normal limits   GLOMERULAR FILTRATION RATE, ESTIMATED   ANION GAP       (Any cultures that may have been sent were not resulted at the time of this patient visit)    ED Medications administered this visit:   Medications   tranexamic acid (CYKLOKAPRON) injection 1,000 mg (1,000 mg Topical Given 11/22/22 0121)         ED COURSE        CRITICAL CARE:   None    CONSULTS:  None    PROCEDURES:  None    Shared decision making was performed with the patient and/or present family. Goals of care were discussed with patient and/or present family. The results of pertinent diagnostic studies and exam findings were discussed.  The patients provisional diagnosis and plan of care were discussed with the patient and present family. The patient and/or present family expressed understanding of the diagnosis and plan. The nurse was instructed to provide written instructions and appropriate follow-up information. The patient understands their need and responsibility to obtain additional follow-up as instructed. The patient is comfortable with the plan and discharge. The risks of medications administered and prescribed were discussed with the patient and family present. MEDICATION CHANGES     Discharge Medication List as of 11/22/2022  1:43 AM          CLINICAL IMPRESSION      1. Encounter for post surgical wound check          DISPOSITION/PLAN     Final diagnoses:   Encounter for post surgical wound check       Dispo: Discharge to home    PATIENT REFERRED TO:  BHUPINDER Patel - CNP  [place] [address] [place] [address]  [phone]    Schedule an appointment as soon as possible for a visit         Be sure to call your surgeon for a follow up appointment in 1-2 days. DISCHARGE MEDICATIONS:  Discharge Medication List as of 11/22/2022  1:43 AM          (Please note that portions of this note were completed with a voice recognition program.  Efforts were made to edit the dictations but occasionally words are mis-transcribed.)    Provider:  I personally performed the services described in the documentation, reviewed and edited the documentation which was dictated, and it accurately records my words and actions.     Giselle Juarez MD 11/22/22 2:55 AM         Giselle Juarez MD  11/22/22 9644

## 2022-11-22 NOTE — ED TRIAGE NOTES
Patient presents to ED with c/o incisional pain and bleeding. Patient states she was in the ED two days ago to remove staples ans stiches following a hysterectomy last wednesday. Patient states she sat down on toilet to pee and felt the incision starting to leak. Bleeding controlled at this time. Patient alert and oriented x4.

## 2022-12-20 ENCOUNTER — OFFICE VISIT (OUTPATIENT)
Dept: CARDIOLOGY CLINIC | Age: 47
End: 2022-12-20
Payer: COMMERCIAL

## 2022-12-20 VITALS
HEART RATE: 56 BPM | DIASTOLIC BLOOD PRESSURE: 64 MMHG | WEIGHT: 220 LBS | RESPIRATION RATE: 18 BRPM | BODY MASS INDEX: 35.36 KG/M2 | HEIGHT: 66 IN | SYSTOLIC BLOOD PRESSURE: 107 MMHG

## 2022-12-20 DIAGNOSIS — I10 HYPERTENSION, UNSPECIFIED TYPE: Primary | ICD-10-CM

## 2022-12-20 DIAGNOSIS — D62 ACUTE BLOOD LOSS ANEMIA: ICD-10-CM

## 2022-12-20 PROCEDURE — 3074F SYST BP LT 130 MM HG: CPT | Performed by: INTERNAL MEDICINE

## 2022-12-20 PROCEDURE — 3078F DIAST BP <80 MM HG: CPT | Performed by: INTERNAL MEDICINE

## 2022-12-20 PROCEDURE — 99214 OFFICE O/P EST MOD 30 MIN: CPT | Performed by: INTERNAL MEDICINE

## 2022-12-20 PROCEDURE — 93000 ELECTROCARDIOGRAM COMPLETE: CPT | Performed by: INTERNAL MEDICINE

## 2022-12-20 RX ORDER — FERROUS SULFATE 325(65) MG
325 TABLET ORAL 2 TIMES DAILY
Qty: 180 TABLET | Refills: 1 | Status: SHIPPED | OUTPATIENT
Start: 2022-12-20

## 2022-12-20 ASSESSMENT — ENCOUNTER SYMPTOMS
BLOOD IN STOOL: 0
COUGH: 0
WHEEZING: 0
STRIDOR: 0
ANAL BLEEDING: 0
APNEA: 0
CHEST TIGHTNESS: 0
VOICE CHANGE: 0
SHORTNESS OF BREATH: 0
VOMITING: 0
TROUBLE SWALLOWING: 0
ABDOMINAL DISTENTION: 0
NAUSEA: 0
COLOR CHANGE: 0
ABDOMINAL PAIN: 0
CHOKING: 0

## 2022-12-20 NOTE — PROGRESS NOTES
[address]  Dept: [address]: [phone]     12/20/2022       Antonio Staff is here today for   Chief Complaint   Patient presents with    Follow-up           Referring Physician:  No ref. provider found     Patient Active Problem List   Diagnosis    Hypothyroidism    Cellulitis of leg, right    Hyperlipidemia    Chest pain at rest    H/O class III angina pectoris    Abnormal nuclear stress test    Incarcerated umbilical hernia    Abdominal pain    Anemia       Review of Systems   Constitutional:  Negative for activity change, appetite change, fatigue, fever and unexpected weight change. HENT:  Negative for congestion, trouble swallowing and voice change. Eyes:  Negative for visual disturbance. Respiratory:  Negative for apnea, cough, choking, chest tightness, shortness of breath, wheezing and stridor. Cardiovascular:  Negative for chest pain, palpitations and leg swelling. Gastrointestinal:  Negative for abdominal distention, abdominal pain, anal bleeding, blood in stool, nausea and vomiting. Endocrine: Negative for cold intolerance and heat intolerance. Genitourinary:  Negative for hematuria. Musculoskeletal:  Negative for arthralgias, gait problem, joint swelling and myalgias. Skin:  Negative for color change and rash. Allergic/Immunologic: Negative for environmental allergies and food allergies. Neurological:  Negative for dizziness, tremors, syncope, facial asymmetry, weakness, light-headedness, numbness and headaches. Hematological:  Does not bruise/bleed easily. Psychiatric/Behavioral:  Negative for agitation, behavioral problems and sleep disturbance.        Past Medical History:   Diagnosis Date    Asthma     Backache     COPD (chronic obstructive pulmonary disease) ([facility] [address]     Juliann Alvarez    Coronary artery disease     Dr. Nighat Contreras of blood and blood forming organ     anemia    Environmental allergies     Hyperlipidemia     Hypertension     Hypothyroidism     Type 2 diabetes mellitus (HCC)        Allergies   Allergen Reactions    Vicodin [Hydrocodone-Acetaminophen] Anxiety       Current Outpatient Medications   Medication Sig Dispense Refill    metoprolol tartrate (LOPRESSOR) 25 MG tablet Take 1 tablet by mouth 2 times daily 180 tablet 3    ferrous sulfate (IRON 325) 325 (65 Fe) MG tablet Take 1 tablet by mouth 2 times daily 180 tablet 1    senna (SENOKOT) 8.6 MG tablet Take 1 tablet by mouth daily      ibuprofen (ADVIL;MOTRIN) 600 MG tablet Take 600 mg by mouth every 6 hours as needed for Pain      Misc Natural Products (FIBER 7 PO) Take by mouth      losartan (COZAAR) 25 MG tablet TAKE ONE TABLET BY MOUTH ONCE DAILY 90 tablet 3    apixaban (ELIQUIS) 5 MG TABS tablet TAKE ONE TABLET BY MOUTH TWICE DAILY 180 tablet 3    ezetimibe (ZETIA) 10 MG tablet Take 1 tablet by mouth daily 90 tablet 3    hydroCHLOROthiazide (HYDRODIURIL) 25 MG tablet Take 1 tablet by mouth daily 90 tablet 3    nitroGLYCERIN (NITROSTAT) 0.4 MG SL tablet Place 1 tablet under the tongue every 5 minutes as needed for Chest pain 25 tablet 3    rosuvastatin (CRESTOR) 40 MG tablet Take 1 tablet by mouth every evening 90 tablet 3    amLODIPine (NORVASC) 5 MG tablet Take 1 tablet by mouth daily 90 tablet 3    Acetaminophen-Caff-Pyrilamine (MIDOL COMPLETE PO) Take by mouth      Multiple Vitamin (MULTIVITAMIN ADULT PO) Take by mouth      Loratadine (CLARITIN PO) Take by mouth      levothyroxine (SYNTHROID) 175 MCG tablet Take 175 mcg by mouth Daily      metFORMIN (GLUCOPHAGE-XR) 500 MG extended release tablet Take 1 tablet by mouth 2 times daily      latanoprost (XALATAN) 0.005 % ophthalmic solution Apply 1 drop to eye nightly      docusate sodium (COLACE) 100 MG capsule Take 100 mg by mouth daily as needed for Constipation      aspirin 81 MG chewable tablet Take 1 tablet by mouth daily 30 tablet 3    acetaminophen (TYLENOL) 325 MG tablet Take 650 mg by mouth every 6 hours as needed. therapeutic multivitamin-minerals (THERAGRAN-M) tablet Take 1 tablet by mouth daily. albuterol sulfate  (90 BASE) MCG/ACT inhaler Inhale 2 puffs into the lungs every 6 hours as needed for Wheezing  (Patient not taking: Reported on 12/20/2022)       No current facility-administered medications for this visit. Social History     Socioeconomic History    Marital status: Single     Spouse name: None    Number of children: None    Years of education: None    Highest education level: None   Tobacco Use    Smoking status: Former     Packs/day: 0.50     Types: Cigarettes    Smokeless tobacco: Never   Substance and Sexual Activity    Alcohol use: Yes     Comment: occasionally    Drug use: No    Sexual activity: Yes     Partners: Male       Family History   Problem Relation Age of Onset    Thyroid Disease Mother     Heart Disease Mother     Cancer Father     Diabetes Father     High Blood Pressure Father     Asthma Brother        Blood pressure 107/64, pulse 56, resp. rate 18, height 5' 6\" (1.676 m), weight 220 lb (99.8 kg), last menstrual period 04/11/2016.     Physical Exam:    General Appearance: alert and oriented to person, place and time, in no acute distress  Cardiovascular: normal rate, regular rhythm, normal S1 and S2, no murmurs, rubs, clicks, or gallops, distal pulses intact, no carotid bruits, no JVD  Pulmonary/Chest: clear to auscultation bilaterally- no wheezes, rales or rhonchi, normal air movement, no respiratory distress  Abdomen: soft, non-tender, non-distended, normal bowel sounds, no masses   Extremities: no cyanosis, clubbing or edema, pulse   Skin: warm and dry, no rash or erythema  Head: normocephalic and atraumatic  Eyes: pupils equal, round, and reactive to light  Neck: supple and non-tender without mass, no thyromegaly   Musculoskeletal: normal range of motion, no joint swelling, deformity or tenderness  Neurological: alert, oriented, normal speech, no focal findings or movement disorder noted    Lab Data:    Cardiac Enzymes:  No results for input(s): CKTOTAL, CKMB, CKMBINDEX, TROPONINI in the last 72 hours. CBC:   Lab Results   Component Value Date/Time    WBC 6.7 11/22/2022 01:03 AM    RBC 3.78 11/22/2022 01:03 AM    HGB 9.5 11/22/2022 01:03 AM    HCT 31.8 11/22/2022 01:03 AM     11/22/2022 01:03 AM       CMP:    Lab Results   Component Value Date/Time     11/22/2022 01:03 AM    K 4.7 11/22/2022 01:03 AM    K 3.9 10/03/2022 02:37 PM     11/22/2022 01:03 AM    CO2 26 11/22/2022 01:03 AM    BUN 14 11/22/2022 01:03 AM    CREATININE 0.7 11/22/2022 01:03 AM    GFRAA >60 02/21/2022 09:18 AM    LABGLOM >60 11/22/2022 12:51 AM    GLUCOSE 123 11/22/2022 01:03 AM    CALCIUM 10.0 11/22/2022 01:03 AM       Hepatic Function Panel:    Lab Results   Component Value Date/Time    ALKPHOS 66 10/04/2022 04:01 AM    ALT 9 10/04/2022 04:01 AM    AST 13 10/04/2022 04:01 AM    PROT 6.7 10/04/2022 04:01 AM    BILITOT 0.3 10/04/2022 04:01 AM    BILIDIR 0.10 08/04/2022 10:46 AM    IBILI 0.33 08/04/2022 10:46 AM    LABALBU 3.4 10/04/2022 04:01 AM       Magnesium:  No results found for: MG    PT/INR:    Lab Results   Component Value Date/Time    INR 1.34 11/22/2022 01:03 AM       HgBA1c:  No results found for: LABA1C    FLP:    Lab Results   Component Value Date/Time    TRIG 202 08/04/2022 10:46 AM    HDL 31 08/04/2022 10:46 AM    LDLCALC 116 08/25/2021 06:02 AM    LDLDIRECT 104 12/30/2019 09:30 AM       TSH:    Lab Results   Component Value Date/Time    TSH 1.27 08/04/2022 10:46 AM        Diagnosis Orders   1. Hypertension, unspecified type  15468 - SD ELECTROCARDIOGRAM, COMPLETE    Ferritin    CBC    Basic Metabolic Panel      2.  Acute blood loss anemia  Ferritin    CBC    Basic Metabolic Panel           Assessment/Plan    Fatou Edwards is a 49years old lady history of coronary artery disease recently she had a hysterectomy. Her hemoglobin dropped up to 8.4 after that the patient is here for a follow-up her hemoglobin has improved to 9.4 she has not been taking any iron supplement she will she was given prescription of her iron supplement and she will be seen in 3 months. With a repeat ferritin level and CBC    Patient has history of coronary artery disease and the patient has history of paroxysmal atrial fibs she is in the ER Eliquis. She does have diabetes hypertension hypercholesterolemia her blood pressure has been under control. He is stable at this point we will continue with the conservative treatment seen in 3 months with the lab work pending her clinical course and further the current decision will be made regarding intervention regarding continuation with the Eliquis. She was advised about diet exercise and risk factor modification her EKG findings and the plan of treatment medication were discussed with her in great details and all her questions were answered to her satisfaction and she is to seek medical attention if she has any change clinical condition thank    Orders Placed This Encounter   Procedures    Ferritin     Standing Status:   Future     Standing Expiration Date:   12/20/2023    CBC     Standing Status:   Future     Standing Expiration Date:   15/55/5707    Basic Metabolic Panel     Standing Status:   Future     Standing Expiration Date:   12/20/2023    15864 - [place] ELECTROCARDIOGRAM, COMPLETE       Return in about 3 months (around 3/20/2023) for cad.      Julia Barnes MD

## 2023-02-20 ENCOUNTER — HOSPITAL ENCOUNTER (OUTPATIENT)
Age: 48
Setting detail: SPECIMEN
Discharge: HOME | End: 2023-02-20

## 2023-02-20 DIAGNOSIS — D62 ACUTE BLOOD LOSS ANEMIA: ICD-10-CM

## 2023-02-20 DIAGNOSIS — I10 HYPERTENSION, UNSPECIFIED TYPE: ICD-10-CM

## 2023-02-20 LAB
ANION GAP SERPL CALCULATED.3IONS-SCNC: 19 MMOL/L (ref 9–17)
BUN SERPL-MCNC: 15 MG/DL (ref 6–20)
CALCIUM SERPL-MCNC: 10.5 MG/DL (ref 8.6–10.4)
CHLORIDE SERPL-SCNC: 98 MMOL/L (ref 98–107)
CO2 SERPL-SCNC: 22 MMOL/L (ref 20–31)
CREAT SERPL-MCNC: 0.6 MG/DL (ref 0.5–0.9)
FERRITIN SERPL-MCNC: 58 NG/ML (ref 13–150)
GFR SERPL CREATININE-BSD FRML MDRD: >60 ML/MIN/1.73M2
GLUCOSE SERPL-MCNC: 109 MG/DL (ref 70–99)
HCT VFR BLD AUTO: 47.2 % (ref 36.3–47.1)
HGB BLD-MCNC: 14.5 G/DL (ref 11.9–15.1)
MCH RBC QN AUTO: 24.7 PG (ref 25.2–33.5)
MCHC RBC AUTO-ENTMCNC: 30.7 G/DL (ref 28.4–34.8)
MCV RBC AUTO: 80.4 FL (ref 82.6–102.9)
NRBC AUTOMATED: 0 PER 100 WBC
PDW BLD-RTO: 19.2 % (ref 11.8–14.4)
PLATELET # BLD AUTO: ABNORMAL K/UL (ref 138–453)
PLATELET, FLUORESCENCE: 276 K/UL (ref 138–453)
PLATELET, IMMATURE FRACTION: 15.8 % (ref 1.1–10.3)
POTASSIUM SERPL-SCNC: 4.1 MMOL/L (ref 3.7–5.3)
RBC # BLD: 5.87 M/UL (ref 3.95–5.11)
SODIUM SERPL-SCNC: 139 MMOL/L (ref 135–144)
WBC # BLD AUTO: 7.9 K/UL (ref 3.5–11.3)

## 2023-02-24 ENCOUNTER — HOSPITAL ENCOUNTER (OUTPATIENT)
Age: 48
Setting detail: SPECIMEN
Discharge: HOME | End: 2023-02-24

## 2023-02-25 LAB — TSH SERPL-ACNC: 3.92 UIU/ML (ref 0.3–5)

## 2023-03-02 ENCOUNTER — OFFICE VISIT (OUTPATIENT)
Dept: CARDIOLOGY CLINIC | Age: 48
End: 2023-03-02
Payer: COMMERCIAL

## 2023-03-02 VITALS
BODY MASS INDEX: 34.07 KG/M2 | DIASTOLIC BLOOD PRESSURE: 65 MMHG | HEART RATE: 115 BPM | WEIGHT: 212 LBS | HEIGHT: 66 IN | RESPIRATION RATE: 18 BRPM | SYSTOLIC BLOOD PRESSURE: 114 MMHG

## 2023-03-02 DIAGNOSIS — E78.5 DYSLIPIDEMIA (HIGH LDL; LOW HDL): ICD-10-CM

## 2023-03-02 DIAGNOSIS — I10 HYPERTENSION, UNSPECIFIED TYPE: Primary | ICD-10-CM

## 2023-03-02 PROCEDURE — 99214 OFFICE O/P EST MOD 30 MIN: CPT | Performed by: INTERNAL MEDICINE

## 2023-03-02 PROCEDURE — 3074F SYST BP LT 130 MM HG: CPT | Performed by: INTERNAL MEDICINE

## 2023-03-02 PROCEDURE — 3078F DIAST BP <80 MM HG: CPT | Performed by: INTERNAL MEDICINE

## 2023-03-02 PROCEDURE — 93000 ELECTROCARDIOGRAM COMPLETE: CPT | Performed by: INTERNAL MEDICINE

## 2023-03-02 RX ORDER — METOPROLOL TARTRATE 50 MG/1
50 TABLET, FILM COATED ORAL 2 TIMES DAILY
Qty: 180 TABLET | Refills: 3 | Status: SHIPPED | OUTPATIENT
Start: 2023-03-02

## 2023-03-02 ASSESSMENT — ENCOUNTER SYMPTOMS
APNEA: 0
COUGH: 0
CHOKING: 0
VOMITING: 0
COLOR CHANGE: 0
CHEST TIGHTNESS: 0
ABDOMINAL DISTENTION: 0
STRIDOR: 0
WHEEZING: 0
VOICE CHANGE: 0
BLOOD IN STOOL: 0
ANAL BLEEDING: 0
TROUBLE SWALLOWING: 0
NAUSEA: 0
ABDOMINAL PAIN: 0
SHORTNESS OF BREATH: 0

## 2023-03-02 NOTE — PROGRESS NOTES
[address]  Dept: [address]: [phone]     3/2/2023       Lea Dutta is here today for   Chief Complaint   Patient presents with    Follow-up           Referring Physician:  No ref. provider found     Patient Active Problem List   Diagnosis    Hypothyroidism    Cellulitis of leg, right    Hyperlipidemia    Chest pain at rest    H/O class III angina pectoris    Abnormal nuclear stress test    Incarcerated umbilical hernia    Abdominal pain    Anemia       Review of Systems   Constitutional:  Negative for activity change, appetite change, fatigue, fever and unexpected weight change. HENT:  Negative for congestion, trouble swallowing and voice change. Eyes:  Negative for visual disturbance. Respiratory:  Negative for apnea, cough, choking, chest tightness, shortness of breath, wheezing and stridor. Cardiovascular:  Negative for chest pain, palpitations and leg swelling. Gastrointestinal:  Negative for abdominal distention, abdominal pain, anal bleeding, blood in stool, nausea and vomiting. Endocrine: Negative for cold intolerance and heat intolerance. Genitourinary:  Negative for hematuria. Musculoskeletal:  Negative for arthralgias, gait problem, joint swelling and myalgias. Skin:  Negative for color change and rash. Allergic/Immunologic: Negative for environmental allergies and food allergies. Neurological:  Negative for dizziness, tremors, syncope, facial asymmetry, weakness, light-headedness, numbness and headaches. Hematological:  Does not bruise/bleed easily. Psychiatric/Behavioral:  Negative for agitation, behavioral problems and sleep disturbance.        Past Medical History:   Diagnosis Date    Asthma     Backache     [facility]     Juliette [address]    Coronary artery disease     Dr. Samantha Stallworth of blood and blood forming organ     anemia    Environmental allergies     Hyperlipidemia     Hypertension     Hypothyroidism     Type 2 diabetes mellitus (HCC)        Allergies   Allergen Reactions    Vicodin [Hydrocodone-Acetaminophen] Anxiety       Current Outpatient Medications   Medication Sig Dispense Refill    metoprolol tartrate (LOPRESSOR) 50 MG tablet Take 1 tablet by mouth 2 times daily 180 tablet 3    ferrous sulfate (IRON 325) 325 (65 Fe) MG tablet Take 1 tablet by mouth 2 times daily 180 tablet 1    senna (SENOKOT) 8.6 MG tablet Take 1 tablet by mouth daily      ibuprofen (ADVIL;MOTRIN) 600 MG tablet Take 600 mg by mouth every 6 hours as needed for Pain      Misc Natural Products (FIBER 7 PO) Take by mouth      losartan (COZAAR) 25 MG tablet TAKE ONE TABLET BY MOUTH ONCE DAILY 90 tablet 3    apixaban (ELIQUIS) 5 MG TABS tablet TAKE ONE TABLET BY MOUTH TWICE DAILY 180 tablet 3    ezetimibe (ZETIA) 10 MG tablet Take 1 tablet by mouth daily 90 tablet 3    hydroCHLOROthiazide (HYDRODIURIL) 25 MG tablet Take 1 tablet by mouth daily 90 tablet 3    nitroGLYCERIN (NITROSTAT) 0.4 MG SL tablet Place 1 tablet under the tongue every 5 minutes as needed for Chest pain 25 tablet 3    rosuvastatin (CRESTOR) 40 MG tablet Take 1 tablet by mouth every evening 90 tablet 3    amLODIPine (NORVASC) 5 MG tablet Take 1 tablet by mouth daily 90 tablet 3    Acetaminophen-Caff-Pyrilamine (MIDOL COMPLETE PO) Take by mouth      Multiple Vitamin (MULTIVITAMIN ADULT PO) Take by mouth      Loratadine (CLARITIN PO) Take by mouth      levothyroxine (SYNTHROID) 175 MCG tablet Take 175 mcg by mouth Daily      metFORMIN (GLUCOPHAGE-XR) 500 MG extended release tablet Take 1 tablet by mouth 2 times daily      latanoprost (XALATAN) 0.005 % ophthalmic solution Apply 1 drop to eye nightly      docusate sodium (COLACE) 100 MG capsule Take 100 mg by mouth daily as needed for Constipation      albuterol sulfate  (90 BASE) MCG/ACT inhaler Inhale 2 puffs into the lungs every 6 hours as needed for Wheezing      aspirin 81 MG chewable tablet Take 1 tablet by mouth daily 30 tablet 3    acetaminophen (TYLENOL) 325 MG tablet Take 650 mg by mouth every 6 hours as needed. therapeutic multivitamin-minerals (THERAGRAN-M) tablet Take 1 tablet by mouth daily. No current facility-administered medications for this visit. Social History     Socioeconomic History    Marital status: Single     Spouse name: None    Number of children: None    Years of education: None    Highest education level: None   Tobacco Use    Smoking status: Every Day     Packs/day: 0.50     Types: Cigarettes    Smokeless tobacco: Never   Substance and Sexual Activity    Alcohol use: Yes     Comment: occasionally    Drug use: No    Sexual activity: Yes     Partners: Male       Family History   Problem Relation Age of Onset    Thyroid Disease Mother     Heart Disease Mother     Cancer Father     Diabetes Father     High Blood Pressure Father     Asthma Brother        Blood pressure 114/65, pulse (!) 115, resp. rate 18, height 5' 6\" (1.676 m), weight 212 lb (96.2 kg), last menstrual period 04/11/2016.     Physical Exam:    General Appearance: alert and oriented to person, place and time, in no acute distress  Cardiovascular: normal rate, regular rhythm, normal S1 and S2, no murmurs, rubs, clicks, or gallops, distal pulses intact, no carotid bruits, no JVD  Pulmonary/Chest: clear to auscultation bilaterally- no wheezes, rales or rhonchi, normal air movement, no respiratory distress  Abdomen: soft, non-tender, non-distended, normal bowel sounds, no masses   Extremities: no cyanosis, clubbing or edema, pulse   Skin: warm and dry, no rash or erythema  Head: normocephalic and atraumatic  Eyes: pupils equal, round, and reactive to light  Neck: supple and non-tender without mass, no thyromegaly   Musculoskeletal: normal range of motion, no joint swelling, deformity or tenderness  Neurological: alert, oriented, normal speech, no focal findings or movement disorder noted    Lab Data:    Cardiac Enzymes:  No results for input(s): CKTOTAL, CKMB, CKMBINDEX, TROPONINI in the last 72 hours. CBC:   Lab Results   Component Value Date/Time    WBC 7.9 02/20/2023 10:41 AM    RBC 5.87 02/20/2023 10:41 AM    HGB 14.5 02/20/2023 10:41 AM    HCT 47.2 02/20/2023 10:41 AM    PLT See Reflexed IPF Result 02/20/2023 10:41 AM       CMP:    Lab Results   Component Value Date/Time     02/20/2023 10:41 AM    K 4.1 02/20/2023 10:41 AM    K 3.9 10/03/2022 02:37 PM    CL 98 02/20/2023 10:41 AM    CO2 22 02/20/2023 10:41 AM    BUN 15 02/20/2023 10:41 AM    CREATININE 0.60 02/20/2023 10:41 AM    GFRAA >60 02/21/2022 09:18 AM    LABGLOM >60 02/20/2023 10:41 AM    GLUCOSE 109 02/20/2023 10:41 AM    CALCIUM 10.5 02/20/2023 10:41 AM       Hepatic Function Panel:    Lab Results   Component Value Date/Time    ALKPHOS 66 10/04/2022 04:01 AM    ALT 9 10/04/2022 04:01 AM    AST 13 10/04/2022 04:01 AM    PROT 6.7 10/04/2022 04:01 AM    BILITOT 0.3 10/04/2022 04:01 AM    BILIDIR 0.10 08/04/2022 10:46 AM    IBILI 0.33 08/04/2022 10:46 AM    LABALBU 3.4 10/04/2022 04:01 AM       Magnesium:  No results found for: MG    PT/INR:    Lab Results   Component Value Date/Time    INR 1.34 11/22/2022 01:03 AM       HgBA1c:  No results found for: LABA1C    FLP:    Lab Results   Component Value Date/Time    TRIG 202 08/04/2022 10:46 AM    HDL 31 08/04/2022 10:46 AM    LDLCALC 116 08/25/2021 06:02 AM    LDLDIRECT 104 12/30/2019 09:30 AM       TSH:    Lab Results   Component Value Date/Time    TSH 3.92 02/24/2023 10:16 AM        Diagnosis Orders   1. Hypertension, unspecified type  55682 - SD ELECTROCARDIOGRAM, COMPLETE    CBC    Basic Metabolic Panel    Lipid Panel    Hepatic Function Panel      2.  Dyslipidemia (high LDL; low HDL)  CBC    Basic Metabolic Panel    Lipid Panel    Hepatic Function Panel Assessment/Plan    Treatments are managing this with the patient is a 49years old lady history of coronary artery disease the patient had a heart cath in 21 showed 85% stenosis up to his margin that we will be treating her medically she been doing well    She has a history of paroxysmal atrial fibs she was given the option to consider the ablation treatment she does not want she wants to continue medical treatment today she is in atrial for with rapid ventricular response metoprolol increased to 50 mg twice a day she will continue with the Eliquis. Her blood count has been under control she is somewhat overweight advised about diet exercise she advised about smoke cessation she was advised about risk factor medication and patient questions were answered to her satisfaction medication very reconciled and sent to her pharmacy she is to seek medical attention if she had any change in clinical condition thank    Orders Placed This Encounter   Procedures    CBC     Standing Status:   Future     Standing Expiration Date:   2/9/4104    Basic Metabolic Panel     Standing Status:   Future     Standing Expiration Date:   3/2/2024    Lipid Panel     Standing Status:   Future     Standing Expiration Date:   3/2/2024     Order Specific Question:   Is Patient Fasting?/# of Hours     Answer:   12 hours    Hepatic Function Panel     Standing Status:   Future     Standing Expiration Date:   3/2/2024    95785 - [place] ELECTROCARDIOGRAM, COMPLETE       Return in about 1 year (around 3/2/2024) for CAD.      Antonio Delcid MD

## 2023-07-03 RX ORDER — FERROUS SULFATE 325(65) MG
TABLET ORAL
Qty: 180 TABLET | Refills: 1 | Status: SHIPPED | OUTPATIENT
Start: 2023-07-03

## 2023-10-13 ENCOUNTER — HOSPITAL ENCOUNTER (OUTPATIENT)
Age: 48
Setting detail: SPECIMEN
Discharge: HOME | End: 2023-10-13

## 2023-10-13 LAB
ALBUMIN SERPL-MCNC: 4 G/DL (ref 3.5–5.2)
ALBUMIN/GLOB SERPL: 1.3 {RATIO} (ref 1–2.5)
ALP SERPL-CCNC: 65 U/L (ref 35–104)
ALT SERPL-CCNC: 22 U/L (ref 5–33)
ANION GAP SERPL CALCULATED.3IONS-SCNC: 17 MMOL/L (ref 9–17)
AST SERPL-CCNC: 22 U/L
BASOPHILS # BLD: 0.07 K/UL (ref 0–0.2)
BASOPHILS NFR BLD: 1 % (ref 0–2)
BILIRUB SERPL-MCNC: 0.3 MG/DL (ref 0.3–1.2)
BUN SERPL-MCNC: 10 MG/DL (ref 6–20)
CALCIUM SERPL-MCNC: 9.9 MG/DL (ref 8.6–10.4)
CHLORIDE SERPL-SCNC: 97 MMOL/L (ref 98–107)
CHOLEST SERPL-MCNC: 112 MG/DL
CHOLESTEROL/HDL RATIO: 2.9
CO2 SERPL-SCNC: 20 MMOL/L (ref 20–31)
CREAT SERPL-MCNC: 0.5 MG/DL (ref 0.5–0.9)
EOSINOPHIL # BLD: 0.13 K/UL (ref 0–0.44)
EOSINOPHILS RELATIVE PERCENT: 2 % (ref 1–4)
ERYTHROCYTE [DISTWIDTH] IN BLOOD BY AUTOMATED COUNT: 15.4 % (ref 11.8–14.4)
GFR SERPL CREATININE-BSD FRML MDRD: >60 ML/MIN/1.73M2
GLUCOSE SERPL-MCNC: 100 MG/DL (ref 70–99)
HCT VFR BLD AUTO: 47.6 % (ref 36.3–47.1)
HDLC SERPL-MCNC: 39 MG/DL
HGB BLD-MCNC: 14.7 G/DL (ref 11.9–15.1)
IMM GRANULOCYTES # BLD AUTO: <0.03 K/UL (ref 0–0.3)
IMM GRANULOCYTES NFR BLD: 0 %
LDLC SERPL CALC-MCNC: 27 MG/DL (ref 0–130)
LYMPHOCYTES NFR BLD: 3.37 K/UL (ref 1.1–3.7)
LYMPHOCYTES RELATIVE PERCENT: 38 % (ref 24–43)
MCH RBC QN AUTO: 27.8 PG (ref 25.2–33.5)
MCHC RBC AUTO-ENTMCNC: 30.9 G/DL (ref 28.4–34.8)
MCV RBC AUTO: 90 FL (ref 82.6–102.9)
MONOCYTES NFR BLD: 0.56 K/UL (ref 0.1–1.2)
MONOCYTES NFR BLD: 6 % (ref 3–12)
NEUTROPHILS NFR BLD: 53 % (ref 36–65)
NEUTS SEG NFR BLD: 4.78 K/UL (ref 1.5–8.1)
NRBC BLD-RTO: 0 PER 100 WBC
PLATELET # BLD AUTO: 231 K/UL (ref 138–453)
PMV BLD AUTO: 12.8 FL (ref 8.1–13.5)
POTASSIUM SERPL-SCNC: 4.1 MMOL/L (ref 3.7–5.3)
PROT SERPL-MCNC: 7.2 G/DL (ref 6.4–8.3)
RBC # BLD AUTO: 5.29 M/UL (ref 3.95–5.11)
RBC # BLD: ABNORMAL 10*6/UL
SODIUM SERPL-SCNC: 134 MMOL/L (ref 135–144)
T4 FREE SERPL-MCNC: 1.7 NG/DL (ref 0.9–1.7)
TRIGL SERPL-MCNC: 229 MG/DL
TSH SERPL DL<=0.05 MIU/L-ACNC: 12.33 UIU/ML (ref 0.3–5)
WBC OTHER # BLD: 8.9 K/UL (ref 3.5–11.3)

## 2023-11-22 RX ORDER — HYDROCHLOROTHIAZIDE 25 MG/1
25 TABLET ORAL DAILY
Qty: 90 TABLET | Refills: 3 | OUTPATIENT
Start: 2023-11-22

## 2023-11-22 RX ORDER — LOSARTAN POTASSIUM 25 MG/1
TABLET ORAL
Qty: 90 TABLET | Refills: 3 | OUTPATIENT
Start: 2023-11-22

## 2023-11-22 RX ORDER — AMLODIPINE BESYLATE 5 MG/1
5 TABLET ORAL DAILY
Qty: 90 TABLET | Refills: 3 | OUTPATIENT
Start: 2023-11-22

## 2023-11-22 RX ORDER — ROSUVASTATIN CALCIUM 40 MG/1
40 TABLET, COATED ORAL EVERY EVENING
Qty: 90 TABLET | Refills: 3 | OUTPATIENT
Start: 2023-11-22

## 2023-11-22 NOTE — TELEPHONE ENCOUNTER
Losartan 25 mg daily  Eliquis 5 mg twice daily  Zetia 10 mg daily  Hydrochlorothiazide 25 mg daily  Nitro prn  Rosuvastatin 40 mg daily  Amlodipine 5 mg daily

## 2023-11-27 RX ORDER — NITROGLYCERIN 0.4 MG/1
0.4 TABLET SUBLINGUAL EVERY 5 MIN PRN
Qty: 25 TABLET | Refills: 3 | Status: SHIPPED | OUTPATIENT
Start: 2023-11-27

## 2023-11-27 RX ORDER — AMLODIPINE BESYLATE 5 MG/1
5 TABLET ORAL DAILY
Qty: 90 TABLET | Refills: 1 | Status: SHIPPED | OUTPATIENT
Start: 2023-11-27

## 2023-11-27 RX ORDER — ROSUVASTATIN CALCIUM 40 MG/1
40 TABLET, COATED ORAL EVERY EVENING
Qty: 90 TABLET | Refills: 1 | Status: SHIPPED | OUTPATIENT
Start: 2023-11-27

## 2023-11-27 RX ORDER — HYDROCHLOROTHIAZIDE 25 MG/1
25 TABLET ORAL DAILY
Qty: 90 TABLET | Refills: 1 | Status: SHIPPED | OUTPATIENT
Start: 2023-11-27

## 2023-11-27 RX ORDER — LOSARTAN POTASSIUM 25 MG/1
TABLET ORAL
Qty: 90 TABLET | Refills: 1 | Status: SHIPPED | OUTPATIENT
Start: 2023-11-27

## 2023-11-27 RX ORDER — EZETIMIBE 10 MG/1
10 TABLET ORAL DAILY
Qty: 90 TABLET | Refills: 1 | Status: SHIPPED | OUTPATIENT
Start: 2023-11-27

## 2024-01-22 RX ORDER — FERROUS SULFATE 325(65) MG
TABLET ORAL
Qty: 180 TABLET | Refills: 0 | Status: SHIPPED | OUTPATIENT
Start: 2024-01-22

## 2024-01-25 RX ORDER — METOPROLOL TARTRATE 50 MG/1
50 TABLET, FILM COATED ORAL 2 TIMES DAILY
Qty: 180 TABLET | Refills: 0 | Status: SHIPPED | OUTPATIENT
Start: 2024-01-25

## 2024-02-14 NOTE — PROGRESS NOTES
[facility] PROFESSIONAL [facility]  [facility] [address]   [address]   [facility] [address]   Dept: [phone]   Dept Fax: [phone]   Loc: [phone]      Chief Complaint   Patient presents with    Follow-up   Yearly follow-up    Cardiologist:  Dr. Lombardi              Today's visit:   Subjective:      General:   No fever, no chills, No fatigue or weight loss/gain  Pulmonary:    No dyspnea, no wheezing  Cardiac:    Denies recent chest discomfort or palpitations  GI:     No nausea or vomiting, no abdominal pain, no black or tarry stools, no blood in stools  Neuro:    No dizziness or light headedness  Musculoskeletal:  No recent active issues, no new musculoskeletal pain  Extremities:   No swelling or claudication symptoms      Past Medical History:   Diagnosis Date    Asthma     Backache     COPD (chronic obstructive pulmonary disease) (HCC)         Coronary artery disease     Dr. Sanchez    Disease of blood and blood forming organ     anemia    Environmental allergies     Hyperlipidemia     Hypertension     Hypothyroidism     Type 2 diabetes mellitus (HCC)        Allergies   Allergen Reactions    Vicodin [Hydrocodone-Acetaminophen] Anxiety       Current Outpatient Medications   Medication Sig Dispense Refill    dapagliflozin (FARXIGA) 10 MG tablet Take 1 tablet by mouth every morning      amLODIPine (NORVASC) 5 MG tablet Take 1 tablet by mouth daily 90 tablet 3    apixaban (ELIQUIS) 5 MG TABS tablet TAKE ONE TABLET BY MOUTH TWICE DAILY 180 tablet 3    ezetimibe (ZETIA) 10 MG tablet Take 1 tablet by mouth daily 90 tablet 3    ferrous sulfate (FEROSUL) 325 (65 Fe) MG tablet Take 1 tablet by mouth 2 times daily 180 tablet 3    hydroCHLOROthiazide (HYDRODIURIL) 25 MG tablet Take 1 tablet by mouth daily 90 tablet 3    losartan (COZAAR) 25 MG tablet TAKE ONE TABLET BY MOUTH ONCE DAILY 90 tablet 3    metoprolol tartrate (LOPRESSOR) 50 MG tablet Take 1 tablet by mouth 2 times

## 2024-02-15 ENCOUNTER — OFFICE VISIT (OUTPATIENT)
Dept: CARDIOLOGY CLINIC | Age: 49
End: 2024-02-15
Payer: COMMERCIAL

## 2024-02-15 VITALS
RESPIRATION RATE: 18 BRPM | HEART RATE: 61 BPM | DIASTOLIC BLOOD PRESSURE: 78 MMHG | SYSTOLIC BLOOD PRESSURE: 110 MMHG | BODY MASS INDEX: 34.72 KG/M2 | WEIGHT: 216 LBS | HEIGHT: 66 IN

## 2024-02-15 DIAGNOSIS — I25.10 CORONARY ARTERY DISEASE INVOLVING NATIVE CORONARY ARTERY OF NATIVE HEART WITHOUT ANGINA PECTORIS: Primary | ICD-10-CM

## 2024-02-15 DIAGNOSIS — I48.0 PAROXYSMAL ATRIAL FIBRILLATION (HCC): ICD-10-CM

## 2024-02-15 DIAGNOSIS — E78.5 HYPERLIPIDEMIA LDL GOAL <70: ICD-10-CM

## 2024-02-15 DIAGNOSIS — I10 PRIMARY HYPERTENSION: ICD-10-CM

## 2024-02-15 DIAGNOSIS — R94.39 ABNORMAL NUCLEAR STRESS TEST: ICD-10-CM

## 2024-02-15 PROCEDURE — 93000 ELECTROCARDIOGRAM COMPLETE: CPT | Performed by: NURSE PRACTITIONER

## 2024-02-15 PROCEDURE — 3078F DIAST BP <80 MM HG: CPT | Performed by: NURSE PRACTITIONER

## 2024-02-15 PROCEDURE — 99214 OFFICE O/P EST MOD 30 MIN: CPT | Performed by: NURSE PRACTITIONER

## 2024-02-15 PROCEDURE — 3074F SYST BP LT 130 MM HG: CPT | Performed by: NURSE PRACTITIONER

## 2024-02-15 RX ORDER — EZETIMIBE 10 MG/1
10 TABLET ORAL DAILY
Qty: 90 TABLET | Refills: 3 | Status: SHIPPED | OUTPATIENT
Start: 2024-02-15

## 2024-02-15 RX ORDER — LOSARTAN POTASSIUM 25 MG/1
TABLET ORAL
Qty: 90 TABLET | Refills: 3 | Status: SHIPPED | OUTPATIENT
Start: 2024-02-15

## 2024-02-15 RX ORDER — HYDROCHLOROTHIAZIDE 25 MG/1
25 TABLET ORAL DAILY
Qty: 90 TABLET | Refills: 3 | Status: SHIPPED | OUTPATIENT
Start: 2024-02-15

## 2024-02-15 RX ORDER — ROSUVASTATIN CALCIUM 40 MG/1
40 TABLET, COATED ORAL EVERY EVENING
Qty: 90 TABLET | Refills: 3 | Status: SHIPPED | OUTPATIENT
Start: 2024-02-15

## 2024-02-15 RX ORDER — METOPROLOL TARTRATE 50 MG/1
50 TABLET, FILM COATED ORAL 2 TIMES DAILY
Qty: 180 TABLET | Refills: 3 | Status: SHIPPED | OUTPATIENT
Start: 2024-02-15

## 2024-02-15 RX ORDER — FERROUS SULFATE 325(65) MG
1 TABLET ORAL 2 TIMES DAILY
Qty: 180 TABLET | Refills: 3 | Status: SHIPPED | OUTPATIENT
Start: 2024-02-15

## 2024-02-15 RX ORDER — AMLODIPINE BESYLATE 5 MG/1
5 TABLET ORAL DAILY
Qty: 90 TABLET | Refills: 3 | Status: SHIPPED | OUTPATIENT
Start: 2024-02-15

## 2025-02-11 LAB
A/G RATIO: NORMAL
ALBUMIN: 4.4 G/DL
ALP BLD-CCNC: 67 U/L
ALT SERPL-CCNC: 25 U/L
AST SERPL-CCNC: 21 U/L
BASOPHILS ABSOLUTE: 0.1 /ΜL
BASOPHILS RELATIVE PERCENT: 1 %
BILIRUB SERPL-MCNC: 0.5 MG/DL (ref 0.1–1.4)
BILIRUBIN DIRECT: 0.18 MG/DL
BILIRUBIN, INDIRECT: NORMAL
BUN BLDV-MCNC: 10 MG/DL
CALCIUM SERPL-MCNC: 9.9 MG/DL
CHLORIDE BLD-SCNC: 102 MMOL/L
CHOLESTEROL, TOTAL: 132 MG/DL
CHOLESTEROL/HDL RATIO: NORMAL
CO2: 24 MMOL/L
CREAT SERPL-MCNC: 0.6 MG/DL
EGFR: 109
EOSINOPHILS ABSOLUTE: 0.2 /ΜL
EOSINOPHILS RELATIVE PERCENT: 3 %
GLOBULIN: NORMAL
GLUCOSE BLD-MCNC: 135 MG/DL
HCT VFR BLD CALC: 44.4 % (ref 36–46)
HDLC SERPL-MCNC: 41 MG/DL (ref 35–70)
HEMOGLOBIN: 14.7 G/DL (ref 12–16)
LDL CHOLESTEROL: 49
LYMPHOCYTES ABSOLUTE: 2.3 /ΜL
LYMPHOCYTES RELATIVE PERCENT: 38 %
MCH RBC QN AUTO: 29.3 PG
MCHC RBC AUTO-ENTMCNC: 33.1 G/DL
MCV RBC AUTO: 89 FL
MONOCYTES ABSOLUTE: 0.4 /ΜL
MONOCYTES RELATIVE PERCENT: 7 %
NEUTROPHILS ABSOLUTE: 3.2 /ΜL
NEUTROPHILS RELATIVE PERCENT: 51 %
NONHDLC SERPL-MCNC: NORMAL MG/DL
PLATELET # BLD: 223 K/ΜL
PMV BLD AUTO: NORMAL FL
POTASSIUM SERPL-SCNC: 4.3 MMOL/L
RBC # BLD: 5.01 10^6/ΜL
SODIUM BLD-SCNC: 138 MMOL/L
TOTAL PROTEIN: 7 G/DL (ref 6.4–8.2)
TRIGL SERPL-MCNC: 269 MG/DL
VLDLC SERPL CALC-MCNC: 42 MG/DL
WBC # BLD: 6.1 10^3/ML

## 2025-02-13 ENCOUNTER — OFFICE VISIT (OUTPATIENT)
Dept: CARDIOLOGY CLINIC | Age: 50
End: 2025-02-13
Payer: COMMERCIAL

## 2025-02-13 VITALS
HEIGHT: 66 IN | BODY MASS INDEX: 35.42 KG/M2 | HEART RATE: 54 BPM | DIASTOLIC BLOOD PRESSURE: 64 MMHG | SYSTOLIC BLOOD PRESSURE: 114 MMHG | WEIGHT: 220.4 LBS

## 2025-02-13 DIAGNOSIS — E78.5 HYPERLIPIDEMIA LDL GOAL <70: ICD-10-CM

## 2025-02-13 DIAGNOSIS — I25.10 CORONARY ARTERY DISEASE INVOLVING NATIVE CORONARY ARTERY OF NATIVE HEART WITHOUT ANGINA PECTORIS: Primary | ICD-10-CM

## 2025-02-13 DIAGNOSIS — I10 PRIMARY HYPERTENSION: ICD-10-CM

## 2025-02-13 DIAGNOSIS — I48.0 PAROXYSMAL ATRIAL FIBRILLATION (HCC): ICD-10-CM

## 2025-02-13 DIAGNOSIS — F17.200 SMOKING: ICD-10-CM

## 2025-02-13 PROCEDURE — 3074F SYST BP LT 130 MM HG: CPT | Performed by: NUCLEAR MEDICINE

## 2025-02-13 PROCEDURE — 93000 ELECTROCARDIOGRAM COMPLETE: CPT | Performed by: NUCLEAR MEDICINE

## 2025-02-13 PROCEDURE — 99215 OFFICE O/P EST HI 40 MIN: CPT | Performed by: NUCLEAR MEDICINE

## 2025-02-13 PROCEDURE — 3078F DIAST BP <80 MM HG: CPT | Performed by: NUCLEAR MEDICINE

## 2025-02-13 RX ORDER — LEVOTHYROXINE SODIUM 25 UG/1
25 TABLET ORAL DAILY
COMMUNITY
Start: 2025-02-06

## 2025-02-13 RX ORDER — AMLODIPINE BESYLATE 5 MG/1
5 TABLET ORAL DAILY
Qty: 90 TABLET | Refills: 3 | Status: SHIPPED | OUTPATIENT
Start: 2025-02-13

## 2025-02-13 RX ORDER — EZETIMIBE 10 MG/1
10 TABLET ORAL DAILY
Qty: 90 TABLET | Refills: 3 | Status: SHIPPED | OUTPATIENT
Start: 2025-02-13

## 2025-02-13 RX ORDER — METOPROLOL TARTRATE 50 MG
50 TABLET ORAL 2 TIMES DAILY
Qty: 180 TABLET | Refills: 3 | Status: SHIPPED | OUTPATIENT
Start: 2025-02-13

## 2025-02-13 RX ORDER — LOSARTAN POTASSIUM 25 MG/1
TABLET ORAL
Qty: 90 TABLET | Refills: 3 | Status: SHIPPED | OUTPATIENT
Start: 2025-02-13

## 2025-02-13 RX ORDER — NITROGLYCERIN 0.4 MG/1
0.4 TABLET SUBLINGUAL EVERY 5 MIN PRN
Qty: 25 TABLET | Refills: 3 | Status: SHIPPED | OUTPATIENT
Start: 2025-02-13

## 2025-02-13 RX ORDER — HYDROCHLOROTHIAZIDE 25 MG/1
25 TABLET ORAL DAILY
Qty: 90 TABLET | Refills: 3 | Status: SHIPPED | OUTPATIENT
Start: 2025-02-13

## 2025-02-13 RX ORDER — ROSUVASTATIN CALCIUM 40 MG/1
40 TABLET, COATED ORAL EVERY EVENING
Qty: 90 TABLET | Refills: 3 | Status: SHIPPED | OUTPATIENT
Start: 2025-02-13

## 2025-02-13 RX ORDER — LEVOTHYROXINE SODIUM 200 UG/1
200 TABLET ORAL DAILY
COMMUNITY
Start: 2025-02-06

## 2025-02-13 NOTE — PROGRESS NOTES
Patient here for follow up.  EKG done today.   
tablet 3    acetaminophen (TYLENOL) 325 MG tablet Take 2 tablets by mouth every 6 hours as needed      therapeutic multivitamin-minerals (THERAGRAN-M) tablet Take 1 tablet by mouth daily       No current facility-administered medications for this visit.     Allergies   Allergen Reactions    Vicodin [Hydrocodone-Acetaminophen] Anxiety     Health Maintenance   Topic Date Due    Depression Screen  Never done    HIV screen  Never done    Hepatitis C screen  Never done    Hepatitis B vaccine (1 of 3 - 19+ 3-dose series) Never done    DTaP/Tdap/Td vaccine (1 - Tdap) Never done    Diabetes screen  Never done    Breast cancer screen  06/14/2020    Flu vaccine (1) Never done    COVID-19 Vaccine (5 - 2024-25 season) 09/01/2024    Shingles vaccine (1 of 2) Never done    Lipids  02/11/2026    Colorectal Cancer Screen  06/20/2028    Pneumococcal 50+ years Vaccine  Completed    Hepatitis A vaccine  Aged Out    Hib vaccine  Aged Out    Polio vaccine  Aged Out    Meningococcal (ACWY) vaccine  Aged Out    Pneumococcal 0-49 years Vaccine  Discontinued    Cervical cancer screen  Discontinued       Subjective:  General:   No fever, no chills, some fatigue or weight loss  Pulmonary:    Some baseline dyspnea, no wheezing  Cardiac:    Denies recent chest pain,   GI:     No nausea or vomiting, no abdominal pain  Neuro:    No dizziness or light headedness,   Musculoskeletal:  No recent active issues  Extremities:   No edema, no obvious claudication       Objective:  General:   Well developed, well nourished  Lungs:   Clear to auscultation  Heart:    Normal S1 S2, Slight murmur. no rubs, no gallops  Abdomen:   Soft, non tender, no organomegalies, positive bowel sounds  Extremities:   No edema, no cyanosis, good peripheral pulses  Neurological:   Awake, alert, oriented. No obvious focal deficits  Musculoskelatal:  No obvious deformities   /64   Pulse 54   Ht 1.676 m (5' 6\")   Wt 100 kg (220 lb 6.4 oz)   LMP 04/11/2016   BMI 35.57

## 2025-03-14 RX ORDER — FERROUS SULFATE 325(65) MG
1 TABLET ORAL 2 TIMES DAILY
Qty: 180 TABLET | Refills: 3 | Status: SHIPPED | OUTPATIENT
Start: 2025-03-14

## 2025-04-28 ENCOUNTER — TRANSCRIBE ORDERS (OUTPATIENT)
Dept: ADMINISTRATIVE | Age: 50
End: 2025-04-28

## 2025-04-28 DIAGNOSIS — Z12.31 ENCOUNTER FOR MAMMOGRAM TO ESTABLISH BASELINE MAMMOGRAM: Primary | ICD-10-CM

## 2025-05-02 ENCOUNTER — HOSPITAL ENCOUNTER (OUTPATIENT)
Dept: WOMENS IMAGING | Age: 50
Discharge: HOME | End: 2025-05-02
Payer: COMMERCIAL

## 2025-05-02 DIAGNOSIS — Z12.31 ENCOUNTER FOR MAMMOGRAM TO ESTABLISH BASELINE MAMMOGRAM: ICD-10-CM

## 2025-05-02 PROCEDURE — 77063 BREAST TOMOSYNTHESIS BI: CPT

## (undated) DEVICE — 35 ML SYRINGE LUER-LOCK TIP: Brand: MONOJECT

## (undated) DEVICE — CANNULA SEAL

## (undated) DEVICE — TUBING IV STOPCOCK 48 CM 3 W

## (undated) DEVICE — SET ADMIN 25ML L117IN PMP MOD CK VLV RLER CLMP 2 SMRTSITE

## (undated) DEVICE — INSUFFLATION NEEDLE TO ESTABLISH PNEUMOPERITONEUM.: Brand: INSUFFLATION NEEDLE

## (undated) DEVICE — SUTURE VCRL + SZ 0 L27IN ABSRB VLT L26MM UR-6 5/8 CIR VCP603H

## (undated) DEVICE — SNARE POLYP SM W13MMXL240CM SHTH DIA2.4MM OVL FLX DISP

## (undated) DEVICE — ARM DRAPE

## (undated) DEVICE — CATHETER ETER IV 22GA L1IN POLYUR STR RADPQ INTROCAN SFTY

## (undated) DEVICE — ABDOMINAL BINDER: Brand: DEROYAL

## (undated) DEVICE — SOLUTION IV 1000ML 0.45% SOD CHL PH 5 INJ USP VIAFLX PLAS

## (undated) DEVICE — CONNECTOR TBNG AUX H2O JET DISP FOR OLY 160/180 SER

## (undated) DEVICE — GLOVE ORANGE PI 8   MSG9080

## (undated) DEVICE — TRAP POLYP ETRAP

## (undated) DEVICE — IV START KIT: Brand: MEDLINE INDUSTRIES, INC.

## (undated) DEVICE — TIP COVER ACCESSORY

## (undated) DEVICE — BASIC SINGLE BASIN BTC-LF: Brand: MEDLINE INDUSTRIES, INC.

## (undated) DEVICE — SET LNR RED GRN W/ BASE CLEANASCOPE

## (undated) DEVICE — COLUMN DRAPE

## (undated) DEVICE — ELECTRO LUBE IS A SINGLE PATIENT USE DEVICE THAT IS INTENDED TO BE USED ON ELECTROSURGICAL ELECTRODES TO REDUCE STICKING.: Brand: KEY SURGICAL ELECTRO LUBE

## (undated) DEVICE — SUTURE STRATAFIX SPRL SZ 1 L9IN ABSRB VLT CT 1 L36MM 1 2 CIR SXPD2B402

## (undated) DEVICE — FORCEPS BX L L240CM DIA2.4MM RAD JAW 4 HOT FOR POLYP DISP

## (undated) DEVICE — AIRSEAL 8 MM CANNULA CAP AND OBTURATOR WITH BLADELESS OPTICAL TIP COMPATIBLE WITH INTUITIVE DA VINCI XI AND DA VINCI X 8 MM INSTRUMENT CANNULA, STANDARD LENGTH: Brand: AIRSEAL

## (undated) DEVICE — SEAL

## (undated) DEVICE — ENDO KIT: Brand: MEDLINE INDUSTRIES, INC.

## (undated) DEVICE — GOWN,SIRUS,NON REINFRCD,LARGE,SET IN SL: Brand: MEDLINE

## (undated) DEVICE — SUTURE STRATAFIX SYMMETRIC SZ 1 L18IN ABSRB VLT CT1 L36CM SXPP1A404

## (undated) DEVICE — FORCEPS BX L240CM JAW DIA3.2MM L CAP W/ NDL MIC MESH TOOTH

## (undated) DEVICE — BLADELESS OBTURATOR: Brand: WECK VISTA

## (undated) DEVICE — SUREFIT, DUAL DISPERSIVE ELECTRODE, CONTACT QUALITY MONITOR: Brand: SUREFIT

## (undated) DEVICE — AIRSEAL BIFURCATED FILTERED TUBESET WITH ACTIVATED CHARCOAL FILTER: Brand: AIRSEAL

## (undated) DEVICE — REDUCER: Brand: ENDOWRIST

## (undated) DEVICE — SOLUTION ANTIFOG VIS SYS CLEARIFY LAPSCP

## (undated) DEVICE — GENERAL LAPAROSCOPY PACK-LF: Brand: MEDLINE INDUSTRIES, INC.

## (undated) DEVICE — GLOVE ORANGE PI 7   MSG9070

## (undated) DEVICE — PENCIL SMK EVAC ALL IN 1 DSGN ENH VISIBILITY IMPROVED AIR

## (undated) DEVICE — BANDAGE ADH W1XL3IN NAT FAB WVN FLX DURABLE N ADH PD SEAL

## (undated) DEVICE — CONMED SCOPE SAVER BITE BLOCK, 20X27 MM: Brand: SCOPE SAVER